# Patient Record
Sex: FEMALE | Race: WHITE | NOT HISPANIC OR LATINO | Employment: OTHER | URBAN - METROPOLITAN AREA
[De-identification: names, ages, dates, MRNs, and addresses within clinical notes are randomized per-mention and may not be internally consistent; named-entity substitution may affect disease eponyms.]

---

## 2017-09-22 ENCOUNTER — CONVERSION ENCOUNTER (OUTPATIENT)
Dept: GENERAL RADIOLOGY | Facility: HOSPITAL | Age: 67
End: 2017-09-22

## 2018-06-13 ENCOUNTER — OFFICE VISIT CONVERTED (OUTPATIENT)
Dept: GASTROENTEROLOGY | Facility: CLINIC | Age: 68
End: 2018-06-13
Attending: NURSE PRACTITIONER

## 2018-07-05 ENCOUNTER — CONVERSION ENCOUNTER (OUTPATIENT)
Dept: FAMILY MEDICINE CLINIC | Facility: CLINIC | Age: 68
End: 2018-07-05

## 2018-07-05 ENCOUNTER — OFFICE VISIT CONVERTED (OUTPATIENT)
Dept: FAMILY MEDICINE CLINIC | Facility: CLINIC | Age: 68
End: 2018-07-05
Attending: FAMILY MEDICINE

## 2018-09-18 ENCOUNTER — OFFICE VISIT CONVERTED (OUTPATIENT)
Dept: FAMILY MEDICINE CLINIC | Facility: CLINIC | Age: 68
End: 2018-09-18
Attending: FAMILY MEDICINE

## 2018-09-18 ENCOUNTER — CONVERSION ENCOUNTER (OUTPATIENT)
Dept: FAMILY MEDICINE CLINIC | Facility: CLINIC | Age: 68
End: 2018-09-18

## 2018-09-27 ENCOUNTER — OFFICE VISIT CONVERTED (OUTPATIENT)
Dept: GASTROENTEROLOGY | Facility: CLINIC | Age: 68
End: 2018-09-27
Attending: NURSE PRACTITIONER

## 2019-01-08 ENCOUNTER — HOSPITAL ENCOUNTER (OUTPATIENT)
Dept: GENERAL RADIOLOGY | Facility: HOSPITAL | Age: 69
Discharge: HOME | End: 2019-01-08
Attending: OBSTETRICS & GYNECOLOGY

## 2019-01-08 ENCOUNTER — OFFICE VISIT CONVERTED (OUTPATIENT)
Dept: FAMILY MEDICINE CLINIC | Facility: CLINIC | Age: 69
End: 2019-01-08
Attending: FAMILY MEDICINE

## 2019-01-08 ENCOUNTER — CONVERSION ENCOUNTER (OUTPATIENT)
Dept: FAMILY MEDICINE CLINIC | Facility: CLINIC | Age: 69
End: 2019-01-08

## 2019-02-07 ENCOUNTER — CONVERSION ENCOUNTER (OUTPATIENT)
Dept: FAMILY MEDICINE CLINIC | Facility: CLINIC | Age: 69
End: 2019-02-07

## 2019-02-07 ENCOUNTER — OFFICE VISIT CONVERTED (OUTPATIENT)
Dept: FAMILY MEDICINE CLINIC | Facility: CLINIC | Age: 69
End: 2019-02-07
Attending: FAMILY MEDICINE

## 2019-03-21 ENCOUNTER — OFFICE VISIT CONVERTED (OUTPATIENT)
Dept: FAMILY MEDICINE CLINIC | Facility: CLINIC | Age: 69
End: 2019-03-21
Attending: FAMILY MEDICINE

## 2019-03-21 ENCOUNTER — CONVERSION ENCOUNTER (OUTPATIENT)
Dept: FAMILY MEDICINE CLINIC | Facility: CLINIC | Age: 69
End: 2019-03-21

## 2019-03-27 ENCOUNTER — CONVERSION ENCOUNTER (OUTPATIENT)
Dept: GASTROENTEROLOGY | Facility: CLINIC | Age: 69
End: 2019-03-27

## 2019-03-27 ENCOUNTER — OFFICE VISIT CONVERTED (OUTPATIENT)
Dept: GASTROENTEROLOGY | Facility: CLINIC | Age: 69
End: 2019-03-27
Attending: NURSE PRACTITIONER

## 2019-06-24 ENCOUNTER — CONVERSION ENCOUNTER (OUTPATIENT)
Dept: FAMILY MEDICINE CLINIC | Facility: CLINIC | Age: 69
End: 2019-06-24

## 2019-06-24 ENCOUNTER — OFFICE VISIT CONVERTED (OUTPATIENT)
Dept: FAMILY MEDICINE CLINIC | Facility: CLINIC | Age: 69
End: 2019-06-24
Attending: FAMILY MEDICINE

## 2019-09-26 ENCOUNTER — HOSPITAL ENCOUNTER (OUTPATIENT)
Dept: FAMILY MEDICINE CLINIC | Facility: CLINIC | Age: 69
Discharge: HOME | End: 2019-09-26
Attending: FAMILY MEDICINE

## 2019-09-26 ENCOUNTER — CONVERSION ENCOUNTER (OUTPATIENT)
Dept: FAMILY MEDICINE CLINIC | Facility: CLINIC | Age: 69
End: 2019-09-26

## 2019-09-26 ENCOUNTER — OFFICE VISIT CONVERTED (OUTPATIENT)
Dept: FAMILY MEDICINE CLINIC | Facility: CLINIC | Age: 69
End: 2019-09-26
Attending: FAMILY MEDICINE

## 2019-09-26 LAB
ALBUMIN SERPL-MCNC: 4.6 G/DL (ref 3.5–5)
ALBUMIN/GLOB SERPL: 1.8 {RATIO} (ref 1.4–2.6)
ALP SERPL-CCNC: 65 U/L (ref 43–160)
ALT SERPL-CCNC: 24 U/L (ref 10–40)
ANION GAP SERPL CALC-SCNC: 17 MMOL/L (ref 8–19)
AST SERPL-CCNC: 23 U/L (ref 15–50)
BASOPHILS # BLD AUTO: 0.03 10*3/UL (ref 0–0.2)
BASOPHILS NFR BLD AUTO: 0.5 % (ref 0–3)
BILIRUB SERPL-MCNC: 0.28 MG/DL (ref 0.2–1.3)
BUN SERPL-MCNC: 13 MG/DL (ref 5–25)
BUN/CREAT SERPL: 17 {RATIO} (ref 6–20)
CALCIUM SERPL-MCNC: 10 MG/DL (ref 8.7–10.4)
CHLORIDE SERPL-SCNC: 102 MMOL/L (ref 99–111)
CHOLEST SERPL-MCNC: 157 MG/DL (ref 107–200)
CHOLEST/HDLC SERPL: 3.7 {RATIO} (ref 3–6)
CONV ABS IMM GRAN: 0.01 10*3/UL (ref 0–0.2)
CONV CO2: 26 MMOL/L (ref 22–32)
CONV IMMATURE GRAN: 0.2 % (ref 0–1.8)
CONV TOTAL PROTEIN: 7.1 G/DL (ref 6.3–8.2)
CREAT UR-MCNC: 0.76 MG/DL (ref 0.5–0.9)
DEPRECATED RDW RBC AUTO: 47.6 FL (ref 36.4–46.3)
EOSINOPHIL # BLD AUTO: 0.16 10*3/UL (ref 0–0.7)
EOSINOPHIL # BLD AUTO: 2.6 % (ref 0–7)
ERYTHROCYTE [DISTWIDTH] IN BLOOD BY AUTOMATED COUNT: 13.2 % (ref 11.7–14.4)
GFR SERPLBLD BASED ON 1.73 SQ M-ARVRAT: >60 ML/MIN/{1.73_M2}
GLOBULIN UR ELPH-MCNC: 2.5 G/DL (ref 2–3.5)
GLUCOSE SERPL-MCNC: 78 MG/DL (ref 65–99)
HCT VFR BLD AUTO: 39 % (ref 37–47)
HDLC SERPL-MCNC: 43 MG/DL (ref 40–60)
HGB BLD-MCNC: 12.5 G/DL (ref 12–16)
LDLC SERPL CALC-MCNC: 87 MG/DL (ref 70–100)
LYMPHOCYTES # BLD AUTO: 2.71 10*3/UL (ref 1–5)
LYMPHOCYTES NFR BLD AUTO: 43.6 % (ref 20–45)
MCH RBC QN AUTO: 31.3 PG (ref 27–31)
MCHC RBC AUTO-ENTMCNC: 32.1 G/DL (ref 33–37)
MCV RBC AUTO: 97.7 FL (ref 81–99)
MONOCYTES # BLD AUTO: 0.54 10*3/UL (ref 0.2–1.2)
MONOCYTES NFR BLD AUTO: 8.7 % (ref 3–10)
NEUTROPHILS # BLD AUTO: 2.76 10*3/UL (ref 2–8)
NEUTROPHILS NFR BLD AUTO: 44.4 % (ref 30–85)
NRBC CBCN: 0 % (ref 0–0.7)
OSMOLALITY SERPL CALC.SUM OF ELEC: 291 MOSM/KG (ref 273–304)
PLATELET # BLD AUTO: 253 10*3/UL (ref 130–400)
PMV BLD AUTO: 11.7 FL (ref 9.4–12.3)
POTASSIUM SERPL-SCNC: 4.3 MMOL/L (ref 3.5–5.3)
RBC # BLD AUTO: 3.99 10*6/UL (ref 4.2–5.4)
SODIUM SERPL-SCNC: 141 MMOL/L (ref 135–147)
TRIGL SERPL-MCNC: 136 MG/DL (ref 40–150)
TSH SERPL-ACNC: 0.22 M[IU]/L (ref 0.27–4.2)
VLDLC SERPL-MCNC: 27 MG/DL (ref 5–37)
WBC # BLD AUTO: 6.21 10*3/UL (ref 4.8–10.8)

## 2019-09-30 ENCOUNTER — OFFICE VISIT CONVERTED (OUTPATIENT)
Dept: GASTROENTEROLOGY | Facility: CLINIC | Age: 69
End: 2019-09-30
Attending: NURSE PRACTITIONER

## 2019-10-18 ENCOUNTER — HOSPITAL ENCOUNTER (OUTPATIENT)
Dept: GASTROENTEROLOGY | Facility: HOSPITAL | Age: 69
Discharge: HOME | End: 2019-10-18
Attending: NURSE PRACTITIONER

## 2019-11-11 ENCOUNTER — HOSPITAL ENCOUNTER (OUTPATIENT)
Dept: GENERAL RADIOLOGY | Facility: HOSPITAL | Age: 69
Discharge: HOME | End: 2019-11-11
Attending: NURSE PRACTITIONER

## 2019-12-04 ENCOUNTER — HOSPITAL ENCOUNTER (OUTPATIENT)
Dept: FAMILY MEDICINE CLINIC | Facility: CLINIC | Age: 69
Discharge: HOME | End: 2019-12-04
Attending: FAMILY MEDICINE

## 2019-12-04 LAB — TSH SERPL-ACNC: 0.83 M[IU]/L (ref 0.27–4.2)

## 2020-01-21 ENCOUNTER — HOSPITAL ENCOUNTER (OUTPATIENT)
Dept: GENERAL RADIOLOGY | Facility: HOSPITAL | Age: 70
Discharge: HOME | End: 2020-01-21
Attending: OBSTETRICS & GYNECOLOGY

## 2020-03-10 ENCOUNTER — OFFICE VISIT CONVERTED (OUTPATIENT)
Dept: FAMILY MEDICINE CLINIC | Facility: CLINIC | Age: 70
End: 2020-03-10
Attending: FAMILY MEDICINE

## 2020-03-10 ENCOUNTER — CONVERSION ENCOUNTER (OUTPATIENT)
Dept: FAMILY MEDICINE CLINIC | Facility: CLINIC | Age: 70
End: 2020-03-10

## 2020-06-29 ENCOUNTER — CONVERSION ENCOUNTER (OUTPATIENT)
Dept: OTHER | Facility: HOSPITAL | Age: 70
End: 2020-06-29

## 2020-06-29 ENCOUNTER — OFFICE VISIT CONVERTED (OUTPATIENT)
Dept: FAMILY MEDICINE CLINIC | Facility: CLINIC | Age: 70
End: 2020-06-29
Attending: FAMILY MEDICINE

## 2020-07-09 ENCOUNTER — CONVERSION ENCOUNTER (OUTPATIENT)
Dept: FAMILY MEDICINE CLINIC | Facility: CLINIC | Age: 70
End: 2020-07-09

## 2020-07-09 ENCOUNTER — HOSPITAL ENCOUNTER (OUTPATIENT)
Dept: FAMILY MEDICINE CLINIC | Facility: CLINIC | Age: 70
Discharge: HOME | End: 2020-07-09
Attending: FAMILY MEDICINE

## 2020-07-09 ENCOUNTER — OFFICE VISIT CONVERTED (OUTPATIENT)
Dept: FAMILY MEDICINE CLINIC | Facility: CLINIC | Age: 70
End: 2020-07-09
Attending: FAMILY MEDICINE

## 2020-07-11 LAB — BACTERIA SPEC AEROBE CULT: NORMAL

## 2020-08-25 ENCOUNTER — OFFICE VISIT CONVERTED (OUTPATIENT)
Dept: FAMILY MEDICINE CLINIC | Facility: CLINIC | Age: 70
End: 2020-08-25
Attending: FAMILY MEDICINE

## 2020-08-25 ENCOUNTER — CONVERSION ENCOUNTER (OUTPATIENT)
Dept: FAMILY MEDICINE CLINIC | Facility: CLINIC | Age: 70
End: 2020-08-25

## 2020-08-26 ENCOUNTER — OFFICE VISIT CONVERTED (OUTPATIENT)
Dept: GASTROENTEROLOGY | Facility: CLINIC | Age: 70
End: 2020-08-26
Attending: NURSE PRACTITIONER

## 2020-10-20 ENCOUNTER — CONVERSION ENCOUNTER (OUTPATIENT)
Dept: FAMILY MEDICINE CLINIC | Facility: CLINIC | Age: 70
End: 2020-10-20

## 2020-10-20 ENCOUNTER — OFFICE VISIT CONVERTED (OUTPATIENT)
Dept: FAMILY MEDICINE CLINIC | Facility: CLINIC | Age: 70
End: 2020-10-20
Attending: FAMILY MEDICINE

## 2021-02-22 ENCOUNTER — HOSPITAL ENCOUNTER (OUTPATIENT)
Dept: GENERAL RADIOLOGY | Facility: HOSPITAL | Age: 71
Discharge: HOME | End: 2021-02-22
Attending: OBSTETRICS & GYNECOLOGY

## 2021-03-29 ENCOUNTER — CONVERSION ENCOUNTER (OUTPATIENT)
Dept: FAMILY MEDICINE CLINIC | Facility: CLINIC | Age: 71
End: 2021-03-29

## 2021-03-29 ENCOUNTER — HOSPITAL ENCOUNTER (OUTPATIENT)
Dept: FAMILY MEDICINE CLINIC | Facility: CLINIC | Age: 71
Discharge: HOME | End: 2021-03-29
Attending: FAMILY MEDICINE

## 2021-03-29 ENCOUNTER — OFFICE VISIT CONVERTED (OUTPATIENT)
Dept: FAMILY MEDICINE CLINIC | Facility: CLINIC | Age: 71
End: 2021-03-29
Attending: FAMILY MEDICINE

## 2021-03-29 LAB
ALBUMIN SERPL-MCNC: 4.7 G/DL (ref 3.5–5)
ALBUMIN/GLOB SERPL: 1.6 {RATIO} (ref 1.4–2.6)
ALP SERPL-CCNC: 71 U/L (ref 43–160)
ALT SERPL-CCNC: 19 U/L (ref 10–40)
AMPHET UR QL CFM: NEGATIVE
ANION GAP SERPL CALC-SCNC: 18 MMOL/L (ref 8–19)
AST SERPL-CCNC: 18 U/L (ref 15–50)
BARBITURATES UR QL: NEGATIVE
BASOPHILS # BLD AUTO: 0.04 10*3/UL (ref 0–0.2)
BASOPHILS NFR BLD AUTO: 0.6 % (ref 0–3)
BENZODIAZ UR QL SCN: NEGATIVE
BILIRUB SERPL-MCNC: 0.4 MG/DL (ref 0.2–1.3)
BUN SERPL-MCNC: 10 MG/DL (ref 5–25)
BUN/CREAT SERPL: 13 {RATIO} (ref 6–20)
CALCIUM SERPL-MCNC: 10.1 MG/DL (ref 8.7–10.4)
CHLORIDE SERPL-SCNC: 99 MMOL/L (ref 99–111)
CHOLEST SERPL-MCNC: 196 MG/DL (ref 107–200)
CHOLEST/HDLC SERPL: 3.7 {RATIO} (ref 3–6)
CONV ABS IMM GRAN: 0.02 10*3/UL (ref 0–0.2)
CONV AMP/METHAMP UR: NEGATIVE
CONV CO2: 25 MMOL/L (ref 22–32)
CONV COCAINE, UR: NEGATIVE
CONV IMMATURE GRAN: 0.3 % (ref 0–1.8)
CONV TOTAL PROTEIN: 7.6 G/DL (ref 6.3–8.2)
CREAT UR-MCNC: 0.78 MG/DL (ref 0.5–0.9)
DEPRECATED RDW RBC AUTO: 46.4 FL (ref 36.4–46.3)
EOSINOPHIL # BLD AUTO: 0.11 10*3/UL (ref 0–0.7)
EOSINOPHIL # BLD AUTO: 1.5 % (ref 0–7)
ERYTHROCYTE [DISTWIDTH] IN BLOOD BY AUTOMATED COUNT: 13.1 % (ref 11.7–14.4)
GFR SERPLBLD BASED ON 1.73 SQ M-ARVRAT: >60 ML/MIN/{1.73_M2}
GLOBULIN UR ELPH-MCNC: 2.9 G/DL (ref 2–3.5)
GLUCOSE SERPL-MCNC: 80 MG/DL (ref 65–99)
HCT VFR BLD AUTO: 40.4 % (ref 37–47)
HDLC SERPL-MCNC: 53 MG/DL (ref 40–60)
HGB BLD-MCNC: 13.1 G/DL (ref 12–16)
LDLC SERPL CALC-MCNC: 116 MG/DL (ref 70–100)
LYMPHOCYTES # BLD AUTO: 2.5 10*3/UL (ref 1–5)
LYMPHOCYTES NFR BLD AUTO: 34.4 % (ref 20–45)
MCH RBC QN AUTO: 31.4 PG (ref 27–31)
MCHC RBC AUTO-ENTMCNC: 32.4 G/DL (ref 33–37)
MCV RBC AUTO: 96.9 FL (ref 81–99)
MDMA UR QL SCN: NEGATIVE
METHADONE UR QL SCN: NEGATIVE
MONOCYTES # BLD AUTO: 0.49 10*3/UL (ref 0.2–1.2)
MONOCYTES NFR BLD AUTO: 6.7 % (ref 3–10)
NEUTROPHILS # BLD AUTO: 4.11 10*3/UL (ref 2–8)
NEUTROPHILS NFR BLD AUTO: 56.5 % (ref 30–85)
NRBC CBCN: 0 % (ref 0–0.7)
OPIATES UR QL SCN: NEGATIVE
OSMOLALITY SERPL CALC.SUM OF ELEC: 284 MOSM/KG (ref 273–304)
OXYCODONE UR QL SCN: NEGATIVE
PCP UR QL: NEGATIVE
PLATELET # BLD AUTO: 255 10*3/UL (ref 130–400)
PMV BLD AUTO: 11.4 FL (ref 9.4–12.3)
POTASSIUM SERPL-SCNC: 4.1 MMOL/L (ref 3.5–5.3)
RBC # BLD AUTO: 4.17 10*6/UL (ref 4.2–5.4)
SODIUM SERPL-SCNC: 138 MMOL/L (ref 135–147)
THC SERPLBLD CFM-MCNC: NEGATIVE NG/ML
TRIGL SERPL-MCNC: 136 MG/DL (ref 40–150)
TSH SERPL-ACNC: 0.82 M[IU]/L (ref 0.27–4.2)
VLDLC SERPL-MCNC: 27 MG/DL (ref 5–37)
WBC # BLD AUTO: 7.27 10*3/UL (ref 4.8–10.8)

## 2021-05-13 NOTE — PROGRESS NOTES
Progress Note      Patient Name: Shalonda Hart   Patient ID: [id number]   Sex: Female   YOB: 1950    Primary Care Provider: Alo Viramontes III MD   Referring Provider: Alo Viramontes III MD    Visit Date: June 29, 2020    Provider: Alo Viramontse III MD   Location: [facility]   Location Address: [address] [place] [address]  [id number]   Location Phone: [phone]          Chief Complaint  · clearance for surgery 7/15/2020, cystocele, rectocele, bladder prolapse, vaginal vaults prolapse repair.       History Of Present Illness  Shalonda Hart is a 69 year old /White female who presents for evaluation and treatment of:      HPI     Patient is a 69-year-old with a cystocele and rectocele, she is scheduled for surgery 7/15/2020.  Patient is here for clearance.     History of hypertension which is well controlled.  History of Hypothyroidism well controlled.    History of DVT after meniscus surgery    Review of systems     cardiovascular no chest pain no palpitations.  Respiratory no shortness of breath no dyspnea on exertion.   Neuro no sided weakness, no history of focal losses, no problems with speech, no history of TIAs.  Musculoskeletal no calf pain,  patient is exercising daily.    Physical exam     pulse ox is 97% on room air, heart rate 66, temperature 98, blood pressure 130/70, weight is 196 this is a 3 pound weight gain.  General no distress patient appears healthy.  Cardiovascular regular rhythm no murmur  Respiratory no increased work of breathing, lungs clear and equal bilaterally, no wheezes no rales no rhonchi  Neurologic mentation is normal, speech is normal, gait is normal, finger to finger touching is normal, absolutely normal neurologic  Abdomen soft non-tender, no liver enlargement, no spleen enlargement    Assessment     Patient is cleared for her surgery she is at low risk.  patient looks good, She is ready for surgery.  She will use a baby  aspirin afterwards,  she did have a DVT after a meniscus surgery in the past. With good activity and her aspirin should she should be fine.        Plan     Proceed with surgery.  continue exercising until the day of surgery.    take the aspirin after the surgery.       Past Medical History  Disease Name Date Onset Notes   Anxiety --  --    Arthritis --  --    Depression 09/03/2015 --    Diverticulitis --  --    Esophageal dysphagia --  --    Essential hypertension 09/03/2015 --    Fatty liver --  --    Gastroesophageal Reflux Disorder --  --    History of DVT (deep vein thrombosis) 01/08/2019 --    Hypertension --  --    Hypothyroid 07/05/2018 --    Irritable bowel syndrome (IBS) --  --    Low back pain 06/02/2014 --    Medication management 07/05/2018 --    Monitoring long-term use medication 09/03/2015 --    Morbid obesity due to excess calories --  --    Sinus congestion/allergies --  --    Skin lesion 09/03/2015 --    Status post gastric bypass for obesity 03/21/2019 --          Past Surgical History  Procedure Name Date Notes   Appendectomy 1969 --    Cesarian Section 1980, 1982 --    Cholecystectomy 1989 --    Colonoscopy 2017 --    Gastric Sleeve 2019 --    Hysterectomy 1984 --    Shoulder surgery 2001 left shoulder   Tonsillectomy 1968 --          Medication List  Name Date Started Instructions   Calcium 600 600 mg calcium (1,500 mg) oral tablet  --    citalopram 20 mg oral tablet 09/26/2019 TAKE ONE TABLET BY MOUTH DAILY   diltiazem HCl 240 mg oral capsule,extended release 24 hr 09/26/2019 TAKE ONE CAPSULE BY MOUTH DAILY   indapamide 2.5 mg oral tablet 09/26/2019 TAKE ONE TABLET BY MOUTH EVERY MORNING   irbesartan 300 mg oral tablet 09/26/2019 TAKE ONE TABLET BY MOUTH DAILY   levothyroxine 112 mcg oral tablet 03/10/2020 take 1 tablet (112 mcg) by oral route once daily for 90 days   lorazepam 1 mg oral tablet 03/10/2020 take 1 to 2 tablet by oral route BID PRN for 90 days   multivitamin oral capsule  --   "  Protonix 20 mg oral tablet,delayed release (/EC) 09/30/2019 take 1 tablet (20 mg) by oral route once daily for 90 days   Vitamin B-12 sublingual  --    Vitamin D3 2,000 unit oral capsule  take 1 capsule by oral route daily         Allergy List  Allergen Name Date Reaction Notes   hydrochlorothiazide --  --  --    Lidocaine --  --  --    Marcaine --  --  --    SULFA (SULFONAMIDES) --  --  --        Allergies Reconciled  Family Medical History  Disease Name Relative/Age Notes   Heart Disease Mother/   --    Diabetes, unspecified type Mother/  Sister/   --    Depression Mother/  Sister/   --    Hypertension Brother/  Father/  Mother/  Sister/   --          Social History  Finding Status Start/Stop Quantity Notes   Active but no formal exercise --  --/-- --  --    Advance directive declined by patient --  --/-- --  --    Alcohol Former --/-- --  06/07/2017 - denies alcohol use    Tobacco Never --/-- --  --          Immunizations  NameDate Admin Mfg Trade Name Lot Number Route Inj VIS Given VIS Publication   Hepatitis A02/18/2019 NE Not Entered  IM NE 02/21/2019    Comments: given at [facility]   Hepatitis A05/12/2018 NE HAVRIX-ADULT  NE NE 05/16/2018    Comments: given at [facility]   Qlvyvrdzt39/14/2019 NE Fluad  NE NE 10/15/2019    Comments: given at pharmacy   Xfvppkcdf48/15/2018 [facility] Fluzone Quadrivalent [id number] IM RD 10/15/2018 08/07/2015   Comments: ndc [id number]   Xnwfbeplv61/06/2017 [facility] Fluzone Quadrivalent [id number] IM LD 12/06/2017 08/07/2015   Comments: pt tolerated shot well         Vitals  Date Time BP Position Site L\R Cuff Size HR RR TEMP (F) WT  HT  BMI kg/m2 BSA m2 O2 Sat        06/29/2020 10:16 /70 Sitting    66 - R  98 195lbs 16oz 5'  7.5\" 30.24 2.06 97 %              Assessment  · History of DVT (deep vein thrombosis)     V12.51/Z86.718  · Medication management     V58.69/Z79.899  · Status post gastric bypass for " obesity     V45.86/Z98.84  · Hypertension     401.9/I10  · Hypothyroid     244.9/E03.9  · Preoperative clearance     V72.84/Z01.818  ok to proceed with surgery.  · Cystocele with rectocele       Cystocele, unspecified     618.01/N81.10  Rectocele     618.01/N81.6  · Vaginal prolapse     618.00/N81.10  · Bladder prolapse, female, acquired     618.01/N81.10    Problems Reconciled  Plan  · Orders  o ACO-39: Current medications updated and reviewed () - - 06/29/2020  · Medications  o Zantac 150 mg oral tablet   SIG: take 1 tablet by oral route 2 times a day as needed for 90 days   DISP: (180) tablets with 2 refills  Discontinued on 06/29/2020     o Medications have been Reconciled  o Transition of Care or Provider Policy  · Instructions  o Patient is taking medications as prescribed and doing well.   o Take all medications as prescribed/directed.  o Patient instructed/educated on their diet and exercise program.  o Patient was educated/instructed on their diagnosis, treatment and medications prior to discharge from the clinic today.  o Bring all medicines with their bottles to each office visit.  o Time spent with the patient was 25 minutes, more than 50% face to face.  o Chronic conditions reviewed and taken into consideration for today's treatment plan.  o Discussed Covid-19 precautions including, but not limited to, social distancing, avoid touching your face, and hand washing.             Electronically Signed by: Gabrielle Lancaster, -Author on June 29, 2020 11:29:01 AM  Electronically Co-signed by: Alo Viramontes III MD -Reviewer on June 29, 2020 05:13:46 PM

## 2021-05-13 NOTE — PROGRESS NOTES
Progress Note      Patient Name: Shalonda Hart   Patient ID: [id number]   Sex: Female   YOB: 1950    Primary Care Provider: Alo Viramontes III MD   Referring Provider: Alo Viramontes III MD    Visit Date: October 20, 2020    Provider: Alo Viramontes III MD   Location: [facility]   Location Address: [address] [place] [address]  [id number]   Location Phone: [phone]          Chief Complaint  · right hip pain, grabbing pain since September, difficult to walk and muscles tight      History Of Present Illness  Shalonda Hart is a 70 year old /White female who presents for evaluation and treatment of: right hip pain since September, the pain in a grabbing pain, the pain does not radiate down leg. Pt states she is having difficulty walking and muscles feels tight.      HPI     patient is a 70-year-old she has hurt her back.  The pain is in her right buttock, Grabs her.  When she moves it hurts more.  No pain down the leg.  No weakness in the leg.      Review of systems     neurologic no leg weakness.  No burning pain.  Musculoskeletal no particular pain in the groin or the hips of the knee.  Cardiovascular chest pain no palpitations  Respiratory no shortness of breath dyspnea on exertion    Physical exam     weights 196 this is a 1 pound weight gain blood pressure 138/70 temperature is heart rate 75  General no distress  Cardiovascular regular rhythm no murmur  Respiratory no wheezes  Musculoskeletal good internal and external rotation  Back negative, straight leg raise tender to percussion especially right lower back.  When bends and flexes to the side and forward more pain.    Assessment     musculoskeletal back pain.  Will give Flexeril gave her a back sheet    Plan     recheck as needed   Flexeril 1/2-1 tab 3 times daily as well as Tylenol arthritis he may use NSAIDs intermittently.  Should get better over a few weeks.       Past Medical  History  Disease Name Date Onset Notes   Anxiety --  --    Arthritis --  --    Depression 09/03/2015 --    Diverticulitis --  --    Esophageal dysphagia --  --    Essential hypertension 09/03/2015 --    Fatty liver --  --    Gastroesophageal Reflux Disorder --  --    History of cystocele 08/25/2020 repaired 7/2020   History of DVT (deep vein thrombosis) 01/08/2019 --    History of repair of rectocele 08/25/2020 repaired 7/2020   Hypertension --  --    Hypothyroid 07/05/2018 --    Irritable bowel syndrome (IBS) --  --    Low back pain 06/02/2014 --    Medication management 07/05/2018 --    Monitoring long-term use medication 09/03/2015 --    Morbid obesity due to excess calories --  --    Sinus congestion/allergies --  --    Skin lesion 09/03/2015 --    Status post gastric bypass for obesity 03/21/2019 --          Past Surgical History  Procedure Name Date Notes   Appendectomy 1969 --    Cesarian Section 1980, 1982 --    Cholecystectomy 1989 --    Colonoscopy 2017 --    Cystocele 07/15/2020 --    Gastric Sleeve 2019 --    Hysterectomy 1984 --    Rectocele 07/15/2020 --    Shoulder surgery 2001 left shoulder   Tonsillectomy 1968 --    Vaginal Wall Repair 07/15/2020 vaginal vault lift         Medication List  Name Date Started Instructions   Calcium 600 600 mg calcium (1,500 mg) oral tablet  --    citalopram 20 mg oral tablet 08/25/2020 TAKE ONE TABLET BY MOUTH DAILY   diltiazem HCl 240 mg oral capsule,extended release 24 hr 08/25/2020 TAKE ONE CAPSULE BY MOUTH DAILY   indapamide 2.5 mg oral tablet 08/25/2020 TAKE ONE TABLET BY MOUTH EVERY MORNING   irbesartan 300 mg oral tablet 08/25/2020 TAKE ONE TABLET BY MOUTH DAILY   levothyroxine 112 mcg oral tablet 08/25/2020 take 1 tablet (112 mcg) by oral route once daily for 90 days   lorazepam 1 mg oral tablet 08/25/2020 take 1 to 2 tablet by oral route BID PRN for 90 days   multivitamin oral capsule  --    PreserVision AREDS oral  --    Protonix 20 mg oral tablet,delayed  release (/EC) 08/26/2020 take 1 tablet (20 mg) by oral route once daily for 90 days   Vitamin B-12 sublingual  --    Vitamin D3 2,000 unit oral capsule  take 1 capsule by oral route daily         Allergy List  Allergen Name Date Reaction Notes   hydrochlorothiazide --  --  --    Lidocaine --  --  --    Marcaine --  --  --    SULFA (SULFONAMIDES) --  --  --        Allergies Reconciled  Family Medical History  Disease Name Relative/Age Notes   Heart Disease Mother/   --    Diabetes, unspecified type Mother/  Sister/   --    Depression Mother/  Sister/   --    Hypertension Brother/  Father/  Mother/  Sister/   --          Social History  Finding Status Start/Stop Quantity Notes   Active but no formal exercise --  --/-- --  --    Advance Care Plan/Surrogate Decision Maker scanned into EMR --  --/-- --  --    Advance directive declined by patient --  --/-- --  --    Alcohol Former --/-- --  06/07/2017 - denies alcohol use    Tobacco Never --/-- --  --          Immunizations  NameDate Admin Mfg Trade Name Lot Number Route Inj VIS Given VIS Publication   Hepatitis A02/18/2019 NE Not Entered  IM NE 02/21/2019    Comments: given at [facility]   Hepatitis A05/12/2018 NE HAVRIX-ADULT  NE NE 05/16/2018    Comments: given at [facility]   Nksvuoiml34/20/2020 [facility] Fluzone Quadrivalent [id number] IM RD 10/20/2020 08/15/2019   Comments: NDC [id number]         Review of Systems  · Constitutional  o * See HPI  · Eyes  o * See HPI  · HENT  o * See HPI  · Breasts  o * See HPI  · Cardiovascular  o * See HPI  · Respiratory  o * See HPI  · Gastrointestinal  o * See HPI  · Genitourinary  o * See HPI  · Integument  o * See HPI  · Neurologic  o * See HPI  · Musculoskeletal  o * See HPI  · Endocrine  o * See HPI  · Psychiatric  o * See HPI  · Heme-Lymph  o * See HPI  · Allergic-Immunologic  o * See HPI      Vitals  Date Time BP Position Site L\R Cuff Size HR RR TEMP (F) WT  HT  BMI kg/m2 BSA m2 O2 Sat FR L/min FiO2 HC      "  10/20/2020 11:17 /70 Sitting    75 - R  97 195lbs 16oz 5'  7.5\" 30.24 2.06 98 %  21%              Results  · In-Office Procedures  o Lab procedure  § IOP - Urine Drug Screen In-House [facility] (02259)   § Amphetamines Ur Ql: Negative   § Barbiturates Ur Ql: Negative   § Buprenorphine+Nor Ur Ql Scn: Negative   § Benzodiaz Ur Ql: Positive   § Cocaine Ur Ql: Negative   § Methadone Ur Ql: Negative   § Methamphet Ur Ql: Negative   § MDMA Ur Ql Scn: Negative   § Opiates Ur Ql: Negative   § Oxycodone Ur Ql: Negative   § PCP Ur Ql: Negative   § THC Ur Ql: Negative   § Temp in Range?: Within/Acceptable   § Control Seen?: Yes       Assessment  · Need for influenza vaccination     V04.81/Z23  · Medication management     V58.69/Z79.899  · Hypertension     401.9/I10  · Low back pain     724.2/M54.5  · Hip pain, acute, right     719.45/M25.551  · Musculoskeletal back pain     724.5/M54.9  · Muscle tightness     728.9/M62.89    Problems Reconciled  Plan  · Orders  o Fluzone Quadrivalent Vaccine, age 6 months + (38373) - V04.81/Z23 - 10/20/2020   Vaccine - Influenza; Dose: 0.5; Site: Right Deltoid; Route: Intramuscular; Date: 10/20/2020 11:47:00; Exp: 06/30/2021; Lot: [id number]; Mfg: [employer] pasteur; TradeName: Fluzone Quadrivalent; Administered By: Gabrielle Lancaster; Comment: NDC [id number]  o Administration of Influenza Vaccine - Medicare () - V04.81/Z23 - 10/20/2020  o ACO-39: Current medications updated and reviewed (, 1159F) - - 10/20/2020  · Medications  o cyclobenzaprine 10 mg oral tablet   SIG: take 1 tablet (10 mg) by oral route 3 times per day   DISP: (30) Tablet with 1 refills  Prescribed on 10/20/2020     o Medications have been Reconciled  o Transition of Care or Provider Policy  · Instructions  o Patient is taking medications as prescribed and doing well.   o Take all medications as prescribed/directed.  o Patient instructed/educated on their diet and exercise program.  o Patient was educated/instructed on " their diagnosis, treatment and medications prior to discharge from the clinic today.  o Bring all medicines with their bottles to each office visit.  o Time spent with the patient was 20 minutes, more than 50% face to face.  o Chronic conditions reviewed and taken into consideration for today's treatment plan.  o Discussed Covid-19 precautions including, but not limited to, social distancing, avoid touching your face, and hand washing.             Electronically Signed by: Gabrielle Lancaster, -Author on October 20, 2020 05:22:16 PM  Electronically Co-signed by: Alo Viramontes III MD -Reviewer on October 21, 2020 02:08:49 PM

## 2021-05-13 NOTE — PROGRESS NOTES
Progress Note      Patient Name: Shalonda Hart   Patient ID: [id number]   Sex: Female   YOB: 1950    Primary Care Provider: Alo Viramontes III MD   Referring Provider: Alo Viramontes III MD    Visit Date: July 9, 2020    Provider: Alo Viramontes III MD   Location: [facility]   Location Address: [address] [place] [address]  [id number]   Location Phone: [phone]          Chief Complaint  · area of redness in the pubic area      History Of Present Illness  Shalonda Hart is a 69 year old /White female who presents for evaluation and treatment of:      HPI     patient is a 69-year scheduled to have surgery for vaginal and bladder prolapse next week.  She noticed a nodule in her pubic area right side,  some redness and some tenderness.  Pt states it is getting slightly larger.  No past history of methicillin-resistant or staph.  She has hypertension, hypothyroidism, class III obesity status post bariatric surgery.    Review of systems     no other skin lesions.     history due on 7/15/2020 surgery for vaginal prolapse of bladder prolapse, needs to tell surgery if this is a staph, await culture.      Physical exam     blood pressures 128/70 weight 196 no weight gain or loss temperature 97.4  General in no distress   abdomen right pubic nodule 2 cm x 2cm with a small pustule.  No no significant cellulitis around.    In office procedure     after careful chlorhexidine prep 1% Xylocaine anesthesia patient had an I&D and packing of the 2 cm x 2 cm abscess of pus did come out.  Patient is to soak in twice a day.  Culture was done from inside the abscess.      Assessment     I&D 2 x 2 centimeter right pubic abscess,  no antibiotics will be given, watch for cellulitis and await culture results    Plan     call Monday for the culture results, she will need to notify surgeon if it is staph.  Call if cellulitis increases.  Remove packing half-inch today.       Past  Medical History  Disease Name Date Onset Notes   Anxiety --  --    Arthritis --  --    Depression 09/03/2015 --    Diverticulitis --  --    Esophageal dysphagia --  --    Essential hypertension 09/03/2015 --    Fatty liver --  --    Gastroesophageal Reflux Disorder --  --    History of DVT (deep vein thrombosis) 01/08/2019 --    Hypertension --  --    Hypothyroid 07/05/2018 --    Irritable bowel syndrome (IBS) --  --    Low back pain 06/02/2014 --    Medication management 07/05/2018 --    Monitoring long-term use medication 09/03/2015 --    Morbid obesity due to excess calories --  --    Sinus congestion/allergies --  --    Skin lesion 09/03/2015 --    Status post gastric bypass for obesity 03/21/2019 --          Past Surgical History  Procedure Name Date Notes   Appendectomy 1969 --    Cesarian Section 1980, 1982 --    Cholecystectomy 1989 --    Colonoscopy 2017 --    Gastric Sleeve 2019 --    Hysterectomy 1984 --    Shoulder surgery 2001 left shoulder   Tonsillectomy 1968 --          Medication List  Name Date Started Instructions   Calcium 600 600 mg calcium (1,500 mg) oral tablet  --    citalopram 20 mg oral tablet 09/26/2019 TAKE ONE TABLET BY MOUTH DAILY   diltiazem HCl 240 mg oral capsule,extended release 24 hr 09/26/2019 TAKE ONE CAPSULE BY MOUTH DAILY   indapamide 2.5 mg oral tablet 09/26/2019 TAKE ONE TABLET BY MOUTH EVERY MORNING   irbesartan 300 mg oral tablet 09/26/2019 TAKE ONE TABLET BY MOUTH DAILY   levothyroxine 112 mcg oral tablet 03/10/2020 take 1 tablet (112 mcg) by oral route once daily for 90 days   lorazepam 1 mg oral tablet 03/10/2020 take 1 to 2 tablet by oral route BID PRN for 90 days   multivitamin oral capsule  --    Protonix 20 mg oral tablet,delayed release (DR/EC) 09/30/2019 take 1 tablet (20 mg) by oral route once daily for 90 days   Vitamin B-12 sublingual  --    Vitamin D3 2,000 unit oral capsule  take 1 capsule by oral route daily         Allergy List  Allergen Name Date Reaction  "Notes   hydrochlorothiazide --  --  --    Lidocaine --  --  --    Marcaine --  --  --    SULFA (SULFONAMIDES) --  --  --          Family Medical History  Disease Name Relative/Age Notes   Heart Disease Mother/   --    Diabetes, unspecified type Mother/  Sister/   --    Depression Mother/  Sister/   --    Hypertension Brother/  Father/  Mother/  Sister/   --          Social History  Finding Status Start/Stop Quantity Notes   Active but no formal exercise --  --/-- --  --    Advance Care Plan/Surrogate Decision Maker scanned into EMR --  --/-- --  --    Advance directive declined by patient --  --/-- --  --    Alcohol Former --/-- --  06/07/2017 - denies alcohol use    Tobacco Never --/-- --  --          Immunizations  NameDate Admin Mfg Trade Name Lot Number Route Inj VIS Given VIS Publication   Hepatitis A02/18/2019 NE Not Entered  IM NE 02/21/2019    Comments: given at [facility]   Hepatitis A05/12/2018 NE HAVRIX-ADULT  NE NE 05/16/2018    Comments: given at [facility]   Jqvpxcxaz29/14/2019 NE Fluad  NE NE 10/15/2019    Comments: given at pharmacy   Nyustgcru36/15/2018 [facility] Fluzone Quadrivalent [id number] IM RD 10/15/2018 08/07/2015   Comments: ndc [id number]   Atcfjcgcn15/06/2017 [facility] Fluzone Quadrivalent [id number] IM LD 12/06/2017 08/07/2015   Comments: pt tolerated shot well         Review of Systems  · Constitutional  o * See HPI  · Eyes  o * See HPI  · HENT  o * See HPI  · Breasts  o * See HPI  · Cardiovascular  o * See HPI  · Respiratory  o * See HPI  · Gastrointestinal  o * See HPI  · Genitourinary  o * See HPI  · Integument  o * See HPI  · Neurologic  o * See HPI  · Musculoskeletal  o * See HPI  · Endocrine  o * See HPI  · Psychiatric  o * See HPI  · Heme-Lymph  o * See HPI  · Allergic-Immunologic  o * See HPI      Vitals  Date Time BP Position Site L\R Cuff Size HR RR TEMP (F) WT  HT  BMI kg/m2 BSA m2 O2 Sat        07/09/2020 01:18 /70 Sitting      97.4 195lbs 16oz 5'  7.5\" 30.24 2.06   "             Assessment  · Screening for depression     V79.0/Z13.89  · Abscess     682.9/L02.91  · Folliculitis     704.8/L73.9    Problems Reconciled  Plan  · Orders  o ACO-39: Current medications updated and reviewed () - - 07/09/2020  o ACO-18: Negative screen for clinical depression using a standardized tool () - - 07/09/2020  o I & D abscess, complicated or [facility] (42093) - 682.9/L02.91, 704.8/L73.9 - 07/09/2020  o Abscess culture and sensitivity (85421) - 682.9/L02.91, 704.8/L73.9 - 07/09/2020   right pubic area  · Medications  o Medications have been Reconciled  o Transition of Care or Provider Policy  · Instructions  o Depression Screen completed and scanned into the EMR under the designated folder within the patient's documents.  o Today's PHQ-9 result is _1__  o The provider screening met the required time of 15 minutes.  o Patient is taking medications as prescribed and doing well.   o Take all medications as prescribed/directed.  o Patient instructed/educated on their diet and exercise program.  o Patient was educated/instructed on their diagnosis, treatment and medications prior to discharge from the clinic today.  o Bring all medicines with their bottles to each office visit.  o Time spent with the patient was 20 minutes, more than 50% face to face.  o Chronic conditions reviewed and taken into consideration for today's treatment plan.  o Discussed Covid-19 precautions including, but not limited to, social distancing, avoid touching your face, and hand washing.             Electronically Signed by: Gabrielle Lancaster, -Author on July 9, 2020 01:39:07 PM  Electronically Co-signed by: Alo Viramontes III MD -Reviewer on July 9, 2020 02:06:04 PM

## 2021-05-13 NOTE — PROGRESS NOTES
Progress Note      Patient Name: Shalonda Hart   Patient ID: [id number]   Sex: Female   YOB: 1950    Primary Care Provider: Alo Viramontes III MD   Referring Provider: Alo Viramontes III MD    Visit Date: August 25, 2020    Provider: Alo Viramontes III MD   Location: [facility]   Location Address: [address] [place] [address]  [id number]   Location Phone: [phone]          Chief Complaint  · 6 month follow up lorazepam for anxiety      History Of Present Illness  Shalonda Hart is a 69 year old /White female who presents for evaluation and treatment of: here for 6 month follow up anxiety medication, she is taking lorazepam and this continues to work well for them. Pt recently had a cystocele/rectocele and total vaginal vault lift on 7/15/2020      HPI     patient is a 69-year-old  She is here for her 6 month prescription on her Ativan, she has generalized anxiety history status post gastric sleeve.   history of hypertension  history of hypothyroidism. .    Review of systems     cardiovascular no chest pain no [id number]  Respiratory no shortness of breath no dyspnea exertion   pelvic prolapse surgery still needs a sling is is doing well with that though.  GI status post gastric sleeve has gone from 244 to 196, approximately 50 pound weight loss.  Patient had a colonoscopy in 2017 is due in 2022  Musculoskeletal normal bone mass.  Psych doing very well with her Ativan no particular problems with anxiety or depression.    Physical exam     weight is 196 there is no weight gain or loss, blood pressure is 124/72, temperature is 97.4  General no distress  Cardiovascular regular rhythm no murmur  respiratory no increased work of breathing lungs clear and equal bilaterally  Psych does not appear either anxious or depressed.    Assessment     #1 generalized anxiety well controlled   #2 hypertension controlled   #3 class III obesity, better after the gastric  sleeve about a 50 pound weight loss.    #4 status post pelvic prolapse surgery doing well    Plan    Ativan 1 mg give 180 pills and 1 refill   start exercising       Past Medical History  Disease Name Date Onset Notes   Anxiety --  --    Arthritis --  --    Depression 09/03/2015 --    Diverticulitis --  --    Esophageal dysphagia --  --    Essential hypertension 09/03/2015 --    Fatty liver --  --    Gastroesophageal Reflux Disorder --  --    History of cystocele 08/25/2020 repaired 7/2020   History of DVT (deep vein thrombosis) 01/08/2019 --    History of repair of rectocele 08/25/2020 repaired 7/2020   Hypertension --  --    Hypothyroid 07/05/2018 --    Irritable bowel syndrome (IBS) --  --    Low back pain 06/02/2014 --    Medication management 07/05/2018 --    Monitoring long-term use medication 09/03/2015 --    Morbid obesity due to excess calories --  --    Sinus congestion/allergies --  --    Skin lesion 09/03/2015 --    Status post gastric bypass for obesity 03/21/2019 --          Past Surgical History  Procedure Name Date Notes   Appendectomy 1969 --    Cesarian Section 1980, 1982 --    Cholecystectomy 1989 --    Colonoscopy 2017 --    Cystocele 07/15/2020 --    Gastric Sleeve 2019 --    Hysterectomy 1984 --    Rectocele 07/15/2020 --    Shoulder surgery 2001 left shoulder   Tonsillectomy 1968 --    Vaginal Wall Repair 07/15/2020 vaginal vault lift         Medication List  Name Date Started Instructions   Calcium 600 600 mg calcium (1,500 mg) oral tablet  --    citalopram 20 mg oral tablet 08/25/2020 TAKE ONE TABLET BY MOUTH DAILY   diltiazem HCl 240 mg oral capsule,extended release 24 hr 08/25/2020 TAKE ONE CAPSULE BY MOUTH DAILY   indapamide 2.5 mg oral tablet 08/25/2020 TAKE ONE TABLET BY MOUTH EVERY MORNING   irbesartan 300 mg oral tablet 08/25/2020 TAKE ONE TABLET BY MOUTH DAILY   levothyroxine 112 mcg oral tablet 08/25/2020 take 1 tablet (112 mcg) by oral route once daily for 90 days   lorazepam 1 mg  oral tablet 08/25/2020 take 1 to 2 tablet by oral route BID PRN for 90 days   multivitamin oral capsule  --    Protonix 20 mg oral tablet,delayed release (/EC) 09/30/2019 take 1 tablet (20 mg) by oral route once daily for 90 days   Vitamin B-12 sublingual  --    Vitamin D3 2,000 unit oral capsule  take 1 capsule by oral route daily         Allergy List  Allergen Name Date Reaction Notes   hydrochlorothiazide --  --  --    Lidocaine --  --  --    Marcaine --  --  --    SULFA (SULFONAMIDES) --  --  --        Allergies Reconciled  Family Medical History  Disease Name Relative/Age Notes   Heart Disease Mother/   --    Diabetes, unspecified type Mother/  Sister/   --    Depression Mother/  Sister/   --    Hypertension Brother/  Father/  Mother/  Sister/   --          Social History  Finding Status Start/Stop Quantity Notes   Active but no formal exercise --  --/-- --  --    Advance Care Plan/Surrogate Decision Maker scanned into EMR --  --/-- --  --    Advance directive declined by patient --  --/-- --  --    Alcohol Former --/-- --  06/07/2017 - denies alcohol use    Tobacco Never --/-- --  --          Immunizations  NameDate Admin Mfg Trade Name Lot Number Route Inj VIS Given VIS Publication   Hepatitis A02/18/2019 NE Not Entered  IM NE 02/21/2019    Comments: given at [facility]   Hepatitis A05/12/2018 NE HAVRIX-ADULT  NE NE 05/16/2018    Comments: given at [facility]   Fcwwetech54/14/2019 NE Fluad  NE NE 10/15/2019    Comments: given at pharmacy   Ulkbgeldl82/15/2018 [facility] Fluzone Quadrivalent [id number] IM RD 10/15/2018 08/07/2015   Comments: ndc [id number]   Wmcurxqhu69/06/2017 [facility] Fluzone Quadrivalent [id number] IM LD 12/06/2017 08/07/2015   Comments: pt tolerated shot well         Review of Systems  · Constitutional  o * See HPI  · Eyes  o * See HPI  · HENT  o * See HPI  · Breasts  o * See HPI  · Cardiovascular  o * See HPI  · Respiratory  o * See HPI  · Gastrointestinal  o * See HPI  · Genitourinary  o *  "See HPI  · Integument  o * See HPI  · Neurologic  o * See HPI  · Musculoskeletal  o * See HPI  · Endocrine  o * See HPI  · Psychiatric  o * See HPI  · Heme-Lymph  o * See HPI  · Allergic-Immunologic  o * See HPI      Vitals  Date Time BP Position Site L\R Cuff Size HR RR TEMP (F) WT  HT  BMI kg/m2 BSA m2 O2 Sat HC       08/25/2020 10:36 /72 Sitting      97.4 195lbs 16oz 5'  7.5\" 30.24 2.06               Results  · In-Office Procedures  o Lab procedure  § IOP - Urine Drug Screen In-House [facility] ([address])   § Amphetamines Ur Ql: Negative   § Barbiturates Ur Ql: Negative   § Buprenorphine+Nor Ur Ql Scn: Negative   § Benzodiaz Ur Ql: Positive   § Cocaine Ur Ql: Negative   § Methadone Ur Ql: Negative   § Methamphet Ur Ql: Negative   § MDMA Ur Ql Scn: Negative   § Opiates Ur Ql: Negative   § Oxycodone Ur Ql: Negative   § PCP Ur Ql: Negative   § THC Ur Ql: Negative   § Temp in Range?: Within/Acceptable   § Control Seen?: Yes       Assessment  · Medication management     V58.69/Z79.899  · Status post gastric bypass for obesity     V45.86/Z98.84  · Anxiety     300.02/F41.1  · Arthritis     716.90/M19.90  · Hypertension     401.9/I10  · Hypothyroid     244.9/E03.9  · History of cystocele     V13.09/Z87.448  repaired 7/2020 at [facility]  · History of repair of rectocele     V45.89/Z98.890  repaired 7/2020 [facility]     Problems Reconciled  Plan  · Orders  o ACO-39: Current medications updated and reviewed () - - 08/25/2020  o ACO-19: Colorectal cancer screening results documented and reviewed (3017F) - - 08/25/2020  · Medications  o lorazepam 1 mg oral tablet   SIG: take 1 to 2 tablet by oral route BID PRN for 90 days   DISP: (180) tablet with 1 refills  Adjusted on 08/25/2020     o citalopram 20 mg oral tablet   SIG: TAKE ONE TABLET BY MOUTH DAILY   DISP: (90) Tablet with 1 refills  Refilled on 08/25/2020     o diltiazem HCl 240 mg oral capsule,extended release 24 hr   SIG: TAKE ONE CAPSULE BY " MOUTH DAILY   DISP: (90) Unspecified with 1 refills  Refilled on 08/25/2020     o indapamide 2.5 mg oral tablet   SIG: TAKE ONE TABLET BY MOUTH EVERY MORNING   DISP: (90) Tablet with 1 refills  Refilled on 08/25/2020     o irbesartan 300 mg oral tablet   SIG: TAKE ONE TABLET BY MOUTH DAILY   DISP: (90) Tablet with 1 refills  Refilled on 08/25/2020     o levothyroxine 112 mcg oral tablet   SIG: take 1 tablet (112 mcg) by oral route once daily for 90 days   DISP: (90) Tablet with 1 refills  Refilled on 08/25/2020     o Medications have been Reconciled  o Transition of Care or Provider Policy  · Instructions  o Patient is taking medications as prescribed and doing well.   o Take all medications as prescribed/directed.  o Patient instructed/educated on their diet and exercise program.  o Patient was educated/instructed on their diagnosis, treatment and medications prior to discharge from the clinic today.  o Bring all medicines with their bottles to each office visit.  o Time spent with the patient was 30 minutes, more than 50% face to face.  o Chronic conditions reviewed and taken into consideration for today's treatment plan.  o Discussed Covid-19 precautions including, but not limited to, social distancing, avoid touching your face, and hand washing.             Electronically Signed by: Gabrielle Lancaster, -Author on August 25, 2020 06:24:50 PM  Electronically Co-signed by: Alo Viramontes III MD -Reviewer on August 26, 2020 12:06:51 PM

## 2021-05-13 NOTE — PROGRESS NOTES
Progress Note      Patient Name: Shalonda Hart   Patient ID: [id number]   Sex: Female   YOB: 1950    Primary Care Provider: Alo Viramontes III MD   Referring Provider: Alo Viramontes III MD    Visit Date: August 26, 2020    Provider: MARGARITA Rose   Location: [facility]   Location Address: [address] [place] [address], [address]  [place], [place]  [id number]   Location Phone: [phone]          Chief Complaint  · Follow Up Fatty Liver      History Of Present Illness     Ms. Hart presents for f/u of fatty liver and GERD.      She underwent repair of rectocele and cystocele 6 weeks ago in [place].  Reports that she's doing well.      She reports a regular bowel movement.      Only taking protonix PRN when eating spicy foods.      Previously underwent gastric sleeve and had a significant weight loss.  Repeat fibroscan indicates that fatty liver is resolved.       Past Medical History  Anxiety; Arthritis; Depression; Diverticulitis; Esophageal dysphagia; Essential hypertension; Fatty liver; Gastroesophageal Reflux Disorder; History of cystocele; History of DVT (deep vein thrombosis); History of repair of rectocele; Hypertension; Hypothyroid; Irritable bowel syndrome (IBS); Low back pain; Medication management; Monitoring long-term use medication; Morbid obesity due to excess calories; Sinus congestion/allergies; Skin lesion; Status post gastric bypass for obesity         Past Surgical History  Appendectomy; Cesarian Section; Cholecystectomy; Colonoscopy; Cystocele; Gastric Sleeve; Hysterectomy; Rectocele; Shoulder surgery; Tonsillectomy; Vaginal Wall Repair         Medication List  Calcium 600 600 mg calcium (1,500 mg) oral tablet; citalopram 20 mg oral tablet; diltiazem HCl 240 mg oral capsule,extended release 24 hr; indapamide 2.5 mg oral tablet; irbesartan 300 mg oral tablet; levothyroxine 112 mcg oral tablet; lorazepam 1 mg oral tablet; multivitamin oral capsule; PreserVision  "AREDS oral; Protonix 20 mg oral tablet,delayed release (DR/EC); Vitamin B-12 sublingual; Vitamin D3 2,000 unit oral capsule         Allergy List  hydrochlorothiazide; Lidocaine; Marcaine; SULFA (SULFONAMIDES)       Allergies Reconciled  Family Medical History  Heart Disease; Diabetes, unspecified type; Depression; Hypertension         Social History  Active but no formal exercise; Advance Care Plan/Surrogate Decision Maker scanned into EMR; Advance directive declined by patient; Alcohol (Former); Tobacco (Never)         Immunizations  Name Date Admin   Hepatitis A    Hepatitis A    Influenza    Influenza    Influenza          Review of Systems  · Constitutional  o Denies  o : chills, fever  · Cardiovascular  o Denies  o : chest pain, irregular heart beats  · Respiratory  o Denies  o : cough, shortness of breath  · Gastrointestinal  o Admits  o : see HPI   · Endocrine  o Denies  o : weight gain, weight loss      Vitals  Date Time BP Position Site L\R Cuff Size HR RR TEMP (F) WT  HT  BMI kg/m2 BSA m2 O2 Sat HC       08/26/2020 08:59 /61 Sitting      98.1 195lbs 4oz 5'  7.5\" 30.13 2.05           Physical Examination  · Constitutional  o Appearance  o : Healthy-appearing, awake and alert in no acute distress  · Head and Face  o Head  o : Normocephalic with no worriesome skin lesions  · Eyes  o Vision  o :   § Visual Fields  § : eyes move symmetrical in all directions  o Sclerae  o : sclerae anicteric  o Pupils and Irises  o : pupils equal and symmetrical  · Neck  o Inspection/Palpation  o : Trachea is midline, no adenopathy  · Respiratory  o Respiratory Effort  o : Breathing is unlabored.  o Inspection of Chest  o : normal appearance  o Auscultation of Lungs  o : Chest is clear to auscultation bilaterally.  · Cardiovascular  o Heart  o :   § Auscultation of Heart  § : no murmurs, rubs, or gallops  o Peripheral Vascular System  o :   § Extremities  § : no cyanosis, clubbing or edema; "   · Gastrointestinal  o Abdominal Examination  o : Abdomen is soft, nontender to palpation, with normal active bowel sounds, no appreciable hepatosplenomegaly.  o Digital Rectal Exam  o : deferred  · Skin and Subcutaneous Tissue  o General Inspection  o : without focal lesions; turgor is normal  · Psychiatric  o General  o : Alert and oriented x3  o Mood and Affect  o : Mood and affect are appropriate to circumstances  · Extremities  o Extremities  o : No edema, no cyanosis          Assessment  · Gastroesophageal Reflux     530.81/K21.9    Problems Reconciled  Plan  · Medications  o Protonix 20 mg oral tablet,delayed release (DR/EC)   SIG: take 1 tablet (20 mg) by oral route once daily for 90 days   DISP: (90) tablets with 3 refills  Refilled on 08/26/2020     o Medications have been Reconciled  o Transition of Care or Provider Policy  · Instructions  o Information given on current diagnoses.  · Disposition  o Follow up PRN - Call if any change in bowel pattern, abdominal pain, rectal bleeding, or any new GI complaint.            Electronically Signed by: MARGARITA Rose -Author on August 27, 2020 10:13:32 PM

## 2021-05-14 VITALS
HEART RATE: 75 BPM | SYSTOLIC BLOOD PRESSURE: 138 MMHG | BODY MASS INDEX: 30.76 KG/M2 | DIASTOLIC BLOOD PRESSURE: 70 MMHG | TEMPERATURE: 97 F | OXYGEN SATURATION: 98 % | HEIGHT: 67 IN | WEIGHT: 196 LBS

## 2021-05-14 VITALS
TEMPERATURE: 97.6 F | OXYGEN SATURATION: 97 % | HEART RATE: 80 BPM | HEIGHT: 67 IN | WEIGHT: 198 LBS | SYSTOLIC BLOOD PRESSURE: 120 MMHG | DIASTOLIC BLOOD PRESSURE: 72 MMHG | BODY MASS INDEX: 31.08 KG/M2

## 2021-05-14 VITALS
WEIGHT: 196 LBS | HEIGHT: 67 IN | DIASTOLIC BLOOD PRESSURE: 72 MMHG | TEMPERATURE: 97.4 F | SYSTOLIC BLOOD PRESSURE: 124 MMHG | BODY MASS INDEX: 30.76 KG/M2

## 2021-05-14 VITALS
WEIGHT: 195.25 LBS | BODY MASS INDEX: 30.64 KG/M2 | HEIGHT: 67 IN | DIASTOLIC BLOOD PRESSURE: 61 MMHG | TEMPERATURE: 98.1 F | SYSTOLIC BLOOD PRESSURE: 133 MMHG

## 2021-05-14 NOTE — PROGRESS NOTES
Progress Note      Patient Name: Shalonda Hart   Patient ID: [id number]   Sex: Female   YOB: 1950    Primary Care Provider: Alo Viramontes III MD   Referring Provider: Alo Vriamontes III MD    Visit Date: March 29, 2021    Provider: Alo Viramontes III MD   Location: [facility]   Location Address: [address] [place] [address]  [id number]   Location Phone: [phone]          Chief Complaint  · 6 month follow up anxiety medication       History Of Present Illness  Shalonda Hart is a 70 year old /White female who presents for evaluation and treatment of: here today for 6 month follow up anxiety medication. Pt is status post cataract surgery 2/2021 and 3/2021, vaginal prolapse, rectocele, cystocele repair 7/2020 and sling placement 11/2020. Pt see's Dr. Rendon for annual breast and pelvic.      HPI     patient 70 years old just had a sling rocedure  and have to be loosened.    The patient has history of diverticulitis, hypertension, hypothyroidism, irritable bowel, low back pain and status post a gastric sleeve    review of systems     cardiovascular no chest pain no palpitations  Respiratory no shortness of breath no dyspnea exertion   250 cc residual now not leaking  ENT having both eyes lasered for a film with sclera.  GYN sees Dr. Kessler new  Musculoskeletal normal DEXA 2019    Physical exam     blood pressure 120/72 temperature 97.6 weight is 198 pulse ox 97 rate is 80    General no distress  Cardiovascular regular rhythm no murmur   Respiratory no increased work of breathing lungs clear and equal bilaterally     assessment     #1 hypertension   #2 controlled IBS is quiesced sent at the time   #3 is urinary retention status after the sling procedure sling will be loosened some    Plan     recheck 6 months   needs blood work           Past Medical History  Disease Name Date Onset Notes   Anxiety --  --    Arthritis --  --    Depression  09/03/2015 --    Diverticulitis --  --    Esophageal dysphagia --  --    Essential hypertension 09/03/2015 --    Fatty liver --  --    Gastroesophageal Reflux Disorder --  --    History of cystocele 08/25/2020 repaired 7/2020   History of DVT (deep vein thrombosis) 01/08/2019 --    History of repair of rectocele 08/25/2020 repaired 7/2020   Hypertension --  --    Hypothyroid 07/05/2018 --    Irritable bowel syndrome (IBS) --  --    Low back pain 06/02/2014 --    Medication management 07/05/2018 --    Monitoring long-term use medication 09/03/2015 --    Morbid obesity due to excess calories --  --    Sinus congestion/allergies --  --    Skin lesion 09/03/2015 --    Status post gastric bypass for obesity 03/21/2019 --          Past Surgical History  Procedure Name Date Notes   Appendectomy 1969 --    Cesarian Section 1980, 1982 --    Cholecystectomy 1989 --    Colonoscopy 2017 --    Cystocele 07/15/2020 --    Gastric Sleeve 2019 --    Hysterectomy 1984 --    Rectocele 07/15/2020 --    Shoulder surgery 2001 left shoulder   Tonsillectomy 1968 --    Vaginal Wall Repair 07/15/2020 vaginal vault lift         Medication List  Name Date Started Instructions   Calcium 600 600 mg calcium (1,500 mg) oral tablet  --    CARTIA  MG CAPSULE 02/24/2021 TAKE ONE CAPSULE BY MOUTH DAILY   citalopram 20 mg oral tablet 08/25/2020 TAKE ONE TABLET BY MOUTH DAILY   cyclobenzaprine 10 mg oral tablet 10/20/2020 take 1 tablet (10 mg) by oral route 3 times per day   diltiazem HCl 240 mg oral capsule,extended release 24 hr 08/25/2020 TAKE ONE CAPSULE BY MOUTH DAILY   hydrocodone-acetaminophen 5-325 mg oral tablet 10/22/2020 take 1 tablet by oral route every 4 hours as needed for pain   indapamide 2.5 mg oral tablet 08/25/2020 TAKE ONE TABLET BY MOUTH EVERY MORNING   irbesartan 300 mg oral tablet 03/17/2021 TAKE ONE TABLET BY MOUTH DAILY   levothyroxine 112 mcg oral tablet 08/25/2020 take 1 tablet (112 mcg) by oral route once daily for 90  days   lorazepam 1 mg oral tablet 08/25/2020 take 1 to 2 tablet by oral route BID PRN for 90 days   multivitamin oral capsule  --    PreserVision AREDS oral  --    Protonix 20 mg oral tablet,delayed release (/EC) 08/26/2020 take 1 tablet (20 mg) by oral route once daily for 90 days   Vitamin B-12 sublingual  --    Vitamin D3 2,000 unit oral capsule  take 1 capsule by oral route daily         Allergy List  Allergen Name Date Reaction Notes   hydrochlorothiazide --  --  --    Lidocaine --  --  --    Marcaine --  --  --    SULFA (SULFONAMIDES) --  --  --        Allergies Reconciled  Family Medical History  Disease Name Relative/Age Notes   Heart Disease Mother/   --    Diabetes, unspecified type Mother/  Sister/   --    Depression Mother/  Sister/   --    Hypertension Brother/  Father/  Mother/  Sister/   --          Social History  Finding Status Start/Stop Quantity Notes   Active but no formal exercise --  --/-- --  --    Advance Care Plan/Surrogate Decision Maker scanned into EMR --  --/-- --  --    Advance directive declined by patient --  --/-- --  --    Alcohol Former --/-- --  06/07/2017 - denies alcohol use    Tobacco Never --/-- --  --          Immunizations  NameDate Admin Mfg Trade Name Lot Number Route Inj VIS Given VIS Publication   Hepatitis A02/18/2019 NE Not Entered  IM NE 02/21/2019    Comments: given at [facility]   Hepatitis A05/12/2018 NE HAVRIX-ADULT  NE NE 05/16/2018    Comments: given at [facility]   Aasbqctrn09/20/2020 PMC Fluzone Quadrivalent [id number] IM RD 10/20/2020 08/15/2019   Comments: NDC [id number]         Review of Systems  · Constitutional  o * See HPI  · Eyes  o * See HPI  · HENT  o * See HPI  · Breasts  o * See HPI  · Cardiovascular  o * See HPI  · Respiratory  o * See HPI  · Gastrointestinal  o * See HPI  · Genitourinary  o * See HPI  · Integument  o * See HPI  · Neurologic  o * See HPI  · Musculoskeletal  o * See HPI  · Endocrine  o * See HPI  · Psychiatric  o * See  "HPI  · Heme-Lymph  o * See HPI  · Allergic-Immunologic  o * See HPI      Vitals  Date Time BP Position Site L\R Cuff Size HR RR TEMP (F) WT  HT  BMI kg/m2 BSA m2 O2 Sat FR L/min FiO2 HC       03/29/2021 11:57 /72 Sitting    80 - R  97.6 198lbs 0oz 5'  7.5\" 30.55 2.07 97 %  21%              Results  · In-Office Procedures  o Lab procedure  § IOP - Urine Drug Screen In-House [facility] (83371)   § Amphetamines Ur Ql: Negative   § Barbiturates Ur Ql: Negative   § Buprenorphine+Nor Ur Ql Scn: Negative   § Benzodiaz Ur Ql: Negative   § Cocaine Ur Ql: Negative   § Methadone Ur Ql: Negative   § Methamphet Ur Ql: Negative   § MDMA Ur Ql Scn: Negative   § Opiates Ur Ql: Negative   § Oxycodone Ur Ql: Negative   § PCP Ur Ql: Negative   § THC Ur Ql: Negative   § Temp in Range?: Within/Acceptable   § Control Seen?: Yes       Assessment  · Encounter for screening for cardiovascular disorders     V81.2/Z13.6  · History of cystocele     V13.09/Z87.448  repaired 7/2020  · History of repair of rectocele     V45.89/Z98.890  repaired 7/2020  · Medication management     V58.69/Z79.899  · Status post gastric bypass for obesity     V45.86/Z98.84  · Anxiety     300.02/F41.1  · Arthritis     716.90/M19.90  · Hypertension     401.9/I10  · Fatty liver     573.8  · Hypothyroid     244.9/E03.9  · Irritable bowel syndrome (IBS)     564.1/K58.9  · History of cataract extraction     V45.61/Z98.49  · History of bladder surgery     V45.89/Z98.890  · Routine adult health maintenance     V70.0/Z00.00  · History of gastric polyp     V12.79/Z87.19    Problems Reconciled  Plan  · Orders  o Physical, Primary Care Panel (CBC, CMP, Lipid, TSH) [facility] (55081, 40682, 02345, 69228) - V81.2/Z13.6, V58.69/Z79.899, 244.9/E03.9 - 03/29/2021  o ACO-39: Current medications updated and reviewed (1159F, ) - - 03/29/2021  · Medications  o citalopram 20 mg oral tablet   SIG: TAKE ONE TABLET BY MOUTH DAILY   DISP: (90) Tablet with 1 refills  Refilled on 03/29/2021 "     o cyclobenzaprine 10 mg oral tablet   SIG: take 1 tablet (10 mg) by oral route 3 times per day   DISP: (30) Tablet with 1 refills  Refilled on 03/29/2021     o indapamide 2.5 mg oral tablet   SIG: TAKE ONE TABLET BY MOUTH EVERY MORNING   DISP: (90) Tablet with 1 refills  Refilled on 03/29/2021     o irbesartan 300 mg oral tablet   SIG: TAKE ONE TABLET BY MOUTH DAILY   DISP: (90) Tablet with 1 refills  Refilled on 03/29/2021     o levothyroxine 112 mcg oral tablet   SIG: take 1 tablet (112 mcg) by oral route once daily for 90 days   DISP: (90) Tablet with 1 refills  Refilled on 03/29/2021     o lorazepam 1 mg oral tablet   SIG: take 1 to 2 tablet by oral route BID PRN for 90 days   DISP: (180) Tablet with 1 refills  Refilled on 03/29/2021     o CARTIA  MG CAPSULE   SIG: TAKE ONE CAPSULE BY MOUTH DAILY   DISP: (90) Capsule with 0 refills  Discontinued on 03/29/2021     o hydrocodone-acetaminophen 5-325 mg oral tablet   SIG: take 1 tablet by oral route every 4 hours as needed for pain   DISP: (30) Tablet with 0 refills  Discontinued on 03/29/2021     o Medications have been Reconciled  o Transition of Care or Provider Policy  · Instructions  o Patient is taking medications as prescribed and doing well.   o Take all medications as prescribed/directed.  o Patient instructed/educated on their diet and exercise program.  o Patient was educated/instructed on their diagnosis, treatment and medications prior to discharge from the clinic today.  o Bring all medicines with their bottles to each office visit.  o Time spent with the patient was 30 minutes, more than 50% face to face.  o Chronic conditions reviewed and taken into consideration for today's treatment plan.  o Discussed Covid-19 precautions including, but not limited to, social distancing, avoid touching your face, and hand washing.             Electronically Signed by: Gabrielle Lancaster, -Author on March 30, 2021 11:00:14 PM  Electronically Co-signed by: Alo  GERRY Viramontes III MD -Reviewer on March 31, 2021 10:33:16 AM

## 2021-05-15 VITALS
BODY MASS INDEX: 31.08 KG/M2 | WEIGHT: 198 LBS | DIASTOLIC BLOOD PRESSURE: 60 MMHG | SYSTOLIC BLOOD PRESSURE: 118 MMHG | HEIGHT: 67 IN

## 2021-05-15 VITALS
DIASTOLIC BLOOD PRESSURE: 60 MMHG | WEIGHT: 213 LBS | BODY MASS INDEX: 33.43 KG/M2 | SYSTOLIC BLOOD PRESSURE: 122 MMHG | HEIGHT: 67 IN

## 2021-05-15 VITALS
WEIGHT: 196 LBS | TEMPERATURE: 98 F | OXYGEN SATURATION: 97 % | SYSTOLIC BLOOD PRESSURE: 130 MMHG | HEIGHT: 67 IN | HEART RATE: 66 BPM | DIASTOLIC BLOOD PRESSURE: 70 MMHG | BODY MASS INDEX: 30.76 KG/M2

## 2021-05-15 VITALS
BODY MASS INDEX: 29.84 KG/M2 | WEIGHT: 190.12 LBS | SYSTOLIC BLOOD PRESSURE: 129 MMHG | HEIGHT: 67 IN | DIASTOLIC BLOOD PRESSURE: 73 MMHG

## 2021-05-15 VITALS
SYSTOLIC BLOOD PRESSURE: 120 MMHG | WEIGHT: 193 LBS | DIASTOLIC BLOOD PRESSURE: 70 MMHG | HEIGHT: 67 IN | BODY MASS INDEX: 30.29 KG/M2

## 2021-05-15 VITALS
BODY MASS INDEX: 30.76 KG/M2 | WEIGHT: 196 LBS | DIASTOLIC BLOOD PRESSURE: 70 MMHG | TEMPERATURE: 97.4 F | SYSTOLIC BLOOD PRESSURE: 128 MMHG | HEIGHT: 67 IN

## 2021-05-15 VITALS
HEIGHT: 67 IN | DIASTOLIC BLOOD PRESSURE: 72 MMHG | WEIGHT: 193 LBS | BODY MASS INDEX: 30.29 KG/M2 | SYSTOLIC BLOOD PRESSURE: 138 MMHG

## 2021-05-15 VITALS
SYSTOLIC BLOOD PRESSURE: 121 MMHG | HEIGHT: 67 IN | DIASTOLIC BLOOD PRESSURE: 56 MMHG | BODY MASS INDEX: 33.35 KG/M2 | WEIGHT: 212.5 LBS

## 2021-05-16 VITALS
DIASTOLIC BLOOD PRESSURE: 72 MMHG | WEIGHT: 240 LBS | HEIGHT: 67 IN | BODY MASS INDEX: 37.67 KG/M2 | SYSTOLIC BLOOD PRESSURE: 130 MMHG

## 2021-05-16 VITALS
BODY MASS INDEX: 35.31 KG/M2 | SYSTOLIC BLOOD PRESSURE: 128 MMHG | WEIGHT: 225 LBS | HEIGHT: 67 IN | DIASTOLIC BLOOD PRESSURE: 76 MMHG

## 2021-05-16 VITALS
DIASTOLIC BLOOD PRESSURE: 60 MMHG | HEART RATE: 70 BPM | WEIGHT: 239.25 LBS | BODY MASS INDEX: 37.55 KG/M2 | SYSTOLIC BLOOD PRESSURE: 127 MMHG | HEIGHT: 67 IN

## 2021-05-16 VITALS
SYSTOLIC BLOOD PRESSURE: 130 MMHG | BODY MASS INDEX: 36.88 KG/M2 | DIASTOLIC BLOOD PRESSURE: 62 MMHG | HEIGHT: 67 IN | WEIGHT: 235 LBS

## 2021-05-16 VITALS
BODY MASS INDEX: 38.3 KG/M2 | WEIGHT: 244 LBS | DIASTOLIC BLOOD PRESSURE: 62 MMHG | HEIGHT: 67 IN | SYSTOLIC BLOOD PRESSURE: 130 MMHG

## 2021-05-16 VITALS
DIASTOLIC BLOOD PRESSURE: 60 MMHG | HEIGHT: 67 IN | WEIGHT: 237.25 LBS | SYSTOLIC BLOOD PRESSURE: 135 MMHG | BODY MASS INDEX: 37.24 KG/M2

## 2021-08-23 RX ORDER — DILTIAZEM HYDROCHLORIDE 240 MG/1
CAPSULE, COATED, EXTENDED RELEASE ORAL
Qty: 90 CAPSULE | Refills: 0 | Status: SHIPPED | OUTPATIENT
Start: 2021-08-23 | End: 2021-09-29 | Stop reason: SDUPTHER

## 2021-09-20 RX ORDER — PANTOPRAZOLE SODIUM 20 MG/1
TABLET, DELAYED RELEASE ORAL
Qty: 90 TABLET | Refills: 0 | Status: SHIPPED | OUTPATIENT
Start: 2021-09-20 | End: 2021-12-16

## 2021-09-28 RX ORDER — LEVOTHYROXINE SODIUM 112 UG/1
TABLET ORAL
Qty: 90 TABLET | OUTPATIENT
Start: 2021-09-28

## 2021-09-28 RX ORDER — INDAPAMIDE 2.5 MG/1
TABLET, FILM COATED ORAL
Qty: 90 TABLET | OUTPATIENT
Start: 2021-09-28

## 2021-09-28 RX ORDER — CITALOPRAM 20 MG/1
TABLET ORAL
Qty: 90 TABLET | OUTPATIENT
Start: 2021-09-28

## 2021-09-29 ENCOUNTER — OFFICE VISIT (OUTPATIENT)
Dept: FAMILY MEDICINE CLINIC | Facility: CLINIC | Age: 71
End: 2021-09-29

## 2021-09-29 VITALS
DIASTOLIC BLOOD PRESSURE: 62 MMHG | OXYGEN SATURATION: 98 % | HEART RATE: 82 BPM | SYSTOLIC BLOOD PRESSURE: 132 MMHG | HEIGHT: 67 IN | TEMPERATURE: 97.5 F | BODY MASS INDEX: 31.08 KG/M2 | WEIGHT: 198 LBS

## 2021-09-29 DIAGNOSIS — F41.9 ANXIETY: ICD-10-CM

## 2021-09-29 DIAGNOSIS — Z79.899 MEDICATION MANAGEMENT: Primary | ICD-10-CM

## 2021-09-29 PROBLEM — E03.9 HYPOTHYROID: Status: ACTIVE | Noted: 2018-07-05

## 2021-09-29 PROBLEM — Z98.84 HX OF BARIATRIC SURGERY: Status: ACTIVE | Noted: 2021-09-29

## 2021-09-29 LAB
AMPHET+METHAMPHET UR QL: NEGATIVE
BARBITURATES UR QL SCN: NEGATIVE
BENZODIAZ UR QL SCN: NEGATIVE
CANNABINOIDS SERPL QL: NEGATIVE
COCAINE UR QL: NEGATIVE
METHADONE UR QL SCN: NEGATIVE
OPIATES UR QL: NEGATIVE
OXYCODONE UR QL SCN: NEGATIVE

## 2021-09-29 PROCEDURE — 80307 DRUG TEST PRSMV CHEM ANLYZR: CPT | Performed by: FAMILY MEDICINE

## 2021-09-29 PROCEDURE — 99213 OFFICE O/P EST LOW 20 MIN: CPT | Performed by: FAMILY MEDICINE

## 2021-09-29 RX ORDER — CYCLOBENZAPRINE HCL 10 MG
10 TABLET ORAL
COMMUNITY
End: 2021-09-29 | Stop reason: SDUPTHER

## 2021-09-29 RX ORDER — CYCLOBENZAPRINE HCL 10 MG
10 TABLET ORAL 3 TIMES DAILY PRN
Qty: 60 TABLET | Refills: 5 | Status: SHIPPED | OUTPATIENT
Start: 2021-09-29 | End: 2022-04-27 | Stop reason: SDUPTHER

## 2021-09-29 RX ORDER — DOCUSATE CALCIUM 240 MG
240 CAPSULE ORAL 2 TIMES DAILY
COMMUNITY

## 2021-09-29 RX ORDER — LEVOTHYROXINE SODIUM 112 UG/1
112 TABLET ORAL DAILY
Qty: 90 TABLET | Refills: 1 | Status: SHIPPED | OUTPATIENT
Start: 2021-09-29 | End: 2022-03-28

## 2021-09-29 RX ORDER — CITALOPRAM 20 MG/1
20 TABLET ORAL DAILY
Qty: 90 TABLET | Refills: 1 | Status: SHIPPED | OUTPATIENT
Start: 2021-09-29 | End: 2022-03-28

## 2021-09-29 RX ORDER — DILTIAZEM HYDROCHLORIDE 240 MG/1
240 CAPSULE, COATED, EXTENDED RELEASE ORAL DAILY
Qty: 90 CAPSULE | Refills: 1 | Status: SHIPPED | OUTPATIENT
Start: 2021-09-29 | End: 2022-04-27 | Stop reason: SDUPTHER

## 2021-09-29 RX ORDER — LEVOTHYROXINE SODIUM 112 UG/1
TABLET ORAL
COMMUNITY
Start: 2021-06-24 | End: 2021-09-29 | Stop reason: SDUPTHER

## 2021-09-29 RX ORDER — FEXOFENADINE HCL 180 MG/1
180 TABLET ORAL DAILY PRN
COMMUNITY

## 2021-09-29 RX ORDER — LORAZEPAM 1 MG/1
TABLET ORAL
COMMUNITY
Start: 2021-08-12 | End: 2021-09-29 | Stop reason: SDUPTHER

## 2021-09-29 RX ORDER — ANTIOX #8/OM3/DHA/EPA/LUT/ZEAX 250-2.5 MG
1 CAPSULE ORAL
COMMUNITY
End: 2021-12-08

## 2021-09-29 RX ORDER — INDAPAMIDE 2.5 MG/1
2.5 TABLET, FILM COATED ORAL DAILY
Qty: 90 TABLET | Refills: 1 | Status: SHIPPED | OUTPATIENT
Start: 2021-09-29 | End: 2022-03-28

## 2021-09-29 RX ORDER — INDAPAMIDE 2.5 MG/1
TABLET, FILM COATED ORAL
COMMUNITY
Start: 2021-06-24 | End: 2021-09-29 | Stop reason: SDUPTHER

## 2021-09-29 RX ORDER — FOLIC ACID/MULTIVIT,IRON,MINER .4-18-35
1 TABLET,CHEWABLE ORAL DAILY
COMMUNITY

## 2021-09-29 RX ORDER — LORAZEPAM 1 MG/1
1-2 TABLET ORAL 2 TIMES DAILY
Qty: 180 TABLET | Refills: 5 | Status: SHIPPED | OUTPATIENT
Start: 2021-09-29 | End: 2022-04-27 | Stop reason: SDUPTHER

## 2021-09-29 RX ORDER — IRBESARTAN 300 MG/1
300 TABLET ORAL DAILY
Qty: 90 TABLET | Refills: 1 | Status: SHIPPED | OUTPATIENT
Start: 2021-09-29 | End: 2022-04-27 | Stop reason: SDUPTHER

## 2021-09-29 RX ORDER — HYDROCODONE BITARTRATE AND ACETAMINOPHEN 5; 325 MG/1; MG/1
1 TABLET ORAL
COMMUNITY
End: 2021-12-08

## 2021-09-29 RX ORDER — CITALOPRAM 20 MG/1
TABLET ORAL
COMMUNITY
Start: 2021-06-25 | End: 2021-09-29 | Stop reason: SDUPTHER

## 2021-09-29 RX ORDER — IRBESARTAN 300 MG/1
TABLET ORAL
COMMUNITY
Start: 2021-09-12 | End: 2021-09-29 | Stop reason: SDUPTHER

## 2021-09-29 RX ORDER — LANOLIN ALCOHOL/MO/W.PET/CERES
1000 CREAM (GRAM) TOPICAL DAILY
COMMUNITY

## 2021-09-29 NOTE — PROGRESS NOTES
Chief Complaint  Med Management (6 month follow up anxiety medication) and Back Pain    Subjective          Shalonda Hart presents to [facility]  History of Present Illness  71-year-old here for 6-month follow-up anxiety medicine hypertension check history of gastric sleeve lost 50 pounds history of fatty liver    Allergies   Allergen Reactions   • Hydrochlorothiazide Swelling     ORAL SWELLING/SORES   • Sulfa Antibiotics Rash     SWELLING/RASH   • Latex Rash     BLISTERS   • Lidocaine Rash and Swelling     TOPICAL        Past Surgical History:   • APPENDECTOMY   •  SECTION   • CHOLECYSTECTOMY   • COLONOSCOPY   • CYSTOCELE REPAIR   • GASTRIC SLEEVE LAPAROSCOPIC   • HYSTERECTOMY   • OTHER SURGICAL HISTORY    rectocele   • SHOULDER SURGERY   • TONSILLECTOMY   • VAGINAL REPAIR    vaginal wall lift       Social History     Tobacco Use   • Smoking status: Never Smoker   • Smokeless tobacco: Never Used   Substance Use Topics   • Alcohol use: Not Currently     Comment: occasional glass of wine       Family History   Problem Relation Age of Onset   • Heart disease Mother    • Diabetes Mother    • Depression Mother    • Hypertension Mother    • Hypertension Father    • Diabetes Sister    • Depression Sister    • Hypertension Sister    • Hypertension Brother         Health Maintenance Due   Topic Date Due   • Pneumococcal Vaccine 65+ (1 of 2 - PPSV23) Never done   • COVID-19 Vaccine (1) Never done   • TDAP/TD VACCINES (1 - Tdap) Never done   • HEPATITIS C SCREENING  Never done   • ANNUAL WELLNESS VISIT  Never done      Last Completed Colonoscopy          COLORECTAL CANCER SCREENING  Next due on 2019  Outside Claim: AL FECAL BLOOD SCRN IMMUNOASSAY    2017  Outside Claim: AL COLONOSCOPY FLX DX W/COLLJ SPEC WHEN PFRMD                Review of Systems   Cardiovascular no chest pain no palpitations  Respiratory no shortness of breath no dyspnea on exertion  "patient is not gotten the coronavirus vaccine talked about getting it at length  GI patient due a colonoscopy next year  Psych patient is doing well with her anxiety takes lorazepam 1 mg twice daily we will continue  Objective Musculoskeletal  Patient fell contused right chest right chest pain and right shoulder pain  Vitals:    09/29/21 0917   BP: 132/62   Pulse: 82   Temp: 97.5 °F (36.4 °C)   SpO2: 98%   Weight: 89.8 kg (198 lb)   Height: 170.2 cm (67\")     Body mass index is 31.01 kg/m².      Physical Exam  General no distress  Musculoskeletal good range of motion right shoulder with some tenderness right ribs are tender contusion of the ribs with her fall cervical muscle tenderness  Psych no evidence of anxiety or depression  Cardiovascular regular rhythm no murmur  Respiratory lungs clear and equal bilaterally  Result Review :              Assessment and Plan    Generalized anxiety well treated with lorazepam  Continue #2 contusions right chest ribs and shoulder and neck strain acute problem will resolve in 3 weeks or so not taking coronavirus needs to go on and take that now flu shot next month Unseld about diet patient is lost 50 pounds with her gastric sleeve should have help to fatty liver also              Patient was given instructions and counseling regarding her condition or for health maintenance advice. Please see specific information pulled into the AVS if appropriate.     "

## 2021-12-09 ENCOUNTER — TELEPHONE (OUTPATIENT)
Dept: ORTHOPEDIC SURGERY | Facility: CLINIC | Age: 71
End: 2021-12-09

## 2021-12-09 NOTE — TELEPHONE ENCOUNTER
AVULSION FRACTURE OF LEFT TALUS- PRG. NOTES- 12/08/21- IMAGING- 12/08/21-LEFT ANKLE XRAY, LEFT FOOT XRAY AT [facility]

## 2021-12-10 ENCOUNTER — OFFICE VISIT (OUTPATIENT)
Dept: ORTHOPEDIC SURGERY | Facility: CLINIC | Age: 71
End: 2021-12-10

## 2021-12-10 VITALS — OXYGEN SATURATION: 97 % | BODY MASS INDEX: 31.71 KG/M2 | WEIGHT: 202 LBS | HEART RATE: 84 BPM | HEIGHT: 67 IN

## 2021-12-10 DIAGNOSIS — S93.402A SPRAIN OF LEFT ANKLE, UNSPECIFIED LIGAMENT, INITIAL ENCOUNTER: Primary | ICD-10-CM

## 2021-12-10 PROCEDURE — 99203 OFFICE O/P NEW LOW 30 MIN: CPT | Performed by: STUDENT IN AN ORGANIZED HEALTH CARE EDUCATION/TRAINING PROGRAM

## 2021-12-10 NOTE — PROGRESS NOTES
"Chief Complaint  Pain of the Left Ankle    Subjective          Shalonda Hart presents to [facility] for an evaluation of left ankle.     History of Present Illness    Patient had gone down the steps when she had fallen and injured her left ankle in the process. She had immediate pain in the left ankle. She got up and had gone about her day as normal. She drove to [place] and came back home when she noticed swelling in the ankle. Ankle swelling got worse the following day. Injury sustained on 12/7/21. She states she doesn't have much pain but she has soreness in the ankle. Urgent Care placed her into a boot and told her not to weight bear until she was evaluated further by orthopedics. She is using a rollator walker as a make-shift scooter.     Allergies   Allergen Reactions   • Hydrochlorothiazide Swelling     ORAL SWELLING/SORES   • Sulfa Antibiotics Rash     SWELLING/RASH   • Tape Other (See Comments)     Pt gets blisters with plastic tape.   • Lidocaine Rash and Swelling     TOPICAL        Social History     Socioeconomic History   • Marital status:    Tobacco Use   • Smoking status: Never Smoker   • Smokeless tobacco: Never Used   Vaping Use   • Vaping Use: Never used   Substance and Sexual Activity   • Alcohol use: Not Currently     Comment: occasional glass of wine   • Drug use: Never        I reviewed the patient's chief complaint, history of present illness, review of systems, past medical history, surgical history, family history, social history, medications, and allergy list.     REVIEW OF SYSTEMS    Constitutional: Denies fevers, chills, weight loss  Cardiovascular: Denies chest pain, shortness of breath  Skin: Denies rashes, acute skin changes  Neurologic: Denies headache, loss of consciousness  MSK: Left ankle pain      Objective   Vital Signs:   Pulse 84   Ht 170.2 cm (67\")   Wt 91.6 kg (202 lb)   SpO2 97%   BMI 31.64 kg/m²     Body mass index is 31.64 " kg/m².    Physical Exam    LEFT ANKLE: No wounds. Mild residual swelling. Tender over the dorsal mid foot. Non-tender over the forefoot. Achilles intact. Tender lateral border of the foot. Calf soft. No pain with syndesmotic squeeze. Intact ankle plantar flexion and dorsiflexion.     Procedures    Imaging Results (Most Recent)     None         PROCEDURE:  XR FOOT 3+ VW LEFT, 12/08/2021, 17:01  XR ANKLE 3+ VW LEFT, 12/08/2021, 17:03     COMPARISON: [facility], CR, ANKLE >OR= 3V LT, 8/28/2012, 8:25.     INDICATIONS:  swelling/pain after fall yesterday     FINDINGS:          Cortical irregularity along the superior aspect of the anterior talus could represent an avulsion   type fracture.     Mild degenerative change consistent with osteoarthritis is seen in the tibiotalar joint, tarsal   region, 1st MTP joint, and interphalangeal joints.     No lytic or sclerotic bone lesions are seen.     Small plantar spur is evident.     No foreign body is seen.     Mild soft tissue swelling is seen over the lateral malleolus.     CONCLUSION:   Left foot and ankle series demonstrating cortical irregularity along the superior aspect of the   anterior talus which could represent avulsion type fracture.               MELLISA OLSEN MD         Electronically Signed and Approved By: MELLISA OLSEN MD on 12/08/2021 at 17:19             Assessment and Plan    Diagnoses and all orders for this visit:    1. Sprain of left ankle  (Primary)        Discussed treatment plans and diagnosis with the patient. Patient to continue the fracture boot with weightbearing as tolerated. She is to come out of the boot and work on ankle range of motion, these were demonstrated in office. Ice and elevate the foot and ankle to help with the swelling. Patient may bear weight as tolerated with the walking boot. A prescription for a knee scooter provided to help with ambulation.     Call or return if symptoms worsen or patient has any concerns.    Will obtain X-Rays of left ankle at next visit.     Scribed for Flo Steinberg MD by Tory Julio  12/10/2021   09:45 EST         Follow Up   Return in about 4 weeks (around 1/7/2022).  Patient was given instructions and counseling regarding her condition or for health maintenance advice. Please see specific information pulled into the AVS if appropriate.       I have personally performed the services described in this document as scribed by the above individual and it is both accurate and complete.     Flo Steinberg MD  12/10/21  09:54 EST

## 2021-12-16 RX ORDER — PANTOPRAZOLE SODIUM 20 MG/1
TABLET, DELAYED RELEASE ORAL
Qty: 90 TABLET | Refills: 0 | Status: SHIPPED | OUTPATIENT
Start: 2021-12-16 | End: 2022-03-14

## 2021-12-27 ENCOUNTER — OFFICE VISIT (OUTPATIENT)
Dept: ORTHOPEDIC SURGERY | Facility: CLINIC | Age: 71
End: 2021-12-27

## 2021-12-27 VITALS — OXYGEN SATURATION: 95 % | BODY MASS INDEX: 31.71 KG/M2 | HEIGHT: 67 IN | WEIGHT: 202 LBS | HEART RATE: 66 BPM

## 2021-12-27 DIAGNOSIS — S93.402A SPRAIN OF LEFT ANKLE, UNSPECIFIED LIGAMENT, INITIAL ENCOUNTER: Primary | ICD-10-CM

## 2021-12-27 DIAGNOSIS — M25.572 LEFT ANKLE PAIN, UNSPECIFIED CHRONICITY: ICD-10-CM

## 2021-12-27 PROCEDURE — 99213 OFFICE O/P EST LOW 20 MIN: CPT | Performed by: PHYSICIAN ASSISTANT

## 2021-12-27 NOTE — PROGRESS NOTES
"Chief Complaint  Pain of the Left Ankle    Subjective          Shalonda Hart presents to [facility] for   History of Present Illness    Shalonda presents today for follow-up of her left ankle pain.  She has a left ankle sprain with an anterior talus avulsion type fracture that we are treating nonoperatively.  Initial injury 12/8/2021.  Today, she reports she is doing well.  Patient has been wearing her walking boot without any complications.  She reports minimal pain.  She has been using a knee scooter when walking longer distances.  She affirms to working on range of motion exercises at least 3 times a day.  She continues to ice and elevate as needed.  She is taking Tylenol for pain.  She denies numbness or tingling.  Denies new injuries.      Allergies   Allergen Reactions   • Hydrochlorothiazide Swelling     ORAL SWELLING/SORES   • Sulfa Antibiotics Rash     SWELLING/RASH   • Tape Other (See Comments)     Pt gets blisters with plastic tape.   • Lidocaine Rash and Swelling     TOPICAL        Social History     Socioeconomic History   • Marital status:    Tobacco Use   • Smoking status: Never Smoker   • Smokeless tobacco: Never Used   Vaping Use   • Vaping Use: Never used   Substance and Sexual Activity   • Alcohol use: Not Currently     Comment: occasional glass of wine   • Drug use: Never        I reviewed the patient's chief complaint, history of present illness, review of systems, past medical history, surgical history, family history, social history, medications, and allergy list.     REVIEW OF SYSTEMS    Constitutional: Denies fevers, chills, weight loss  Cardiovascular: Denies chest pain, shortness of breath  Skin: Denies rashes, acute skin changes  Neurologic: Denies headache, loss of consciousness  MSK: Left ankle pain      Objective   Vital Signs:   Pulse 66   Ht 170.2 cm (67\")   Wt 91.6 kg (202 lb)   SpO2 95%   BMI 31.64 kg/m²     Body mass index is 31.64 " kg/m².    Physical Exam    General: Alert.  No acute distress.  Left lower extremity: Resolving bruising.  Mild swelling.  Tenderness to palpation over the mid dorsal foot.  Nontender over the remaining foot. Demonstrates active ankle plantarflexion and dorsiflexion without pain.  No pain with syndesmotic squeeze test.  Calf soft, nontender.  Sensation intact to the dorsal and plantar foot.  Palpable pedal pulses.      Procedures    Imaging Results (Most Recent)     Procedure Component Value Units Date/Time    XR Ankle 2 View Left [[id number]] Resulted: 12/27/21 0916     Updated: 12/27/21 0917    Narrative:      Indications: Follow-up left ankle injury    Views: AP, oblique, lateral left ankle    Findings: Avulsion type fracture of the anterior talus again seen.    Fracture appears stable.  Joint remains anatomically aligned.  No   additional fractures are noted.    Comparative Data: Comparative data found and reviewed today.                 Assessment and Plan    Diagnoses and all orders for this visit:    1. Sprain of left ankle  (Primary)    2. Left ankle pain, unspecified chronicity  -     XR Ankle 2 View Amelia Liz presents today for follow-up of her left ankle sprain and anterior talus avulsion type fracture.  Initial injury 12/8/2021.  Patient has been treated nonoperatively in a walking boot.  X-rays were reviewed and are stable today.  Today, she has decreased pain and increased range of motion.  Patient will continue with the walking boot for an additional 2 weeks before transitioning to an ankle brace.  Weight-bear as tolerated in the walking boot.  Continue to ice and elevate as needed.  Continue with home exercises working on range of motion.  Follow-up in 2 weeks for reevaluation.  We will obtain new x-rays of the left ankle at next visit.        Call or return if symptoms worsen or patient has any concerns.   Will obtain X-Rays of left ankle at next visit.       Follow Up   Return in about 2  weeks (around 1/10/2022).  Patient was given instructions and counseling regarding her condition or for health maintenance advice. Please see specific information pulled into the AVS if appropriate.     Lita Mao PA-C  12/27/21  09:18 EST

## 2022-01-10 ENCOUNTER — OFFICE VISIT (OUTPATIENT)
Dept: ORTHOPEDIC SURGERY | Facility: CLINIC | Age: 72
End: 2022-01-10

## 2022-01-10 VITALS — BODY MASS INDEX: 31.71 KG/M2 | WEIGHT: 202 LBS | HEART RATE: 72 BPM | HEIGHT: 67 IN | OXYGEN SATURATION: 98 %

## 2022-01-10 DIAGNOSIS — S93.402A SPRAIN OF LEFT ANKLE, UNSPECIFIED LIGAMENT, INITIAL ENCOUNTER: Primary | ICD-10-CM

## 2022-01-10 DIAGNOSIS — M25.572 LEFT ANKLE PAIN, UNSPECIFIED CHRONICITY: ICD-10-CM

## 2022-01-10 PROCEDURE — 99213 OFFICE O/P EST LOW 20 MIN: CPT | Performed by: PHYSICIAN ASSISTANT

## 2022-01-10 NOTE — PROGRESS NOTES
"Chief Complaint  Pain of the Left Ankle  {Labs  Result Review  Imaging  Med Tab  Media  Procedures :23}   Subjective      Shalonda Hart presents to [facility] for ***    Allergies   Allergen Reactions   • Hydrochlorothiazide Swelling     ORAL SWELLING/SORES   • Sulfa Antibiotics Rash     SWELLING/RASH   • Tape Other (See Comments)     Pt gets blisters with plastic tape.   • Lidocaine Rash and Swelling     TOPICAL        Social History     Socioeconomic History   • Marital status:    Tobacco Use   • Smoking status: Never Smoker   • Smokeless tobacco: Never Used   Vaping Use   • Vaping Use: Never used   Substance and Sexual Activity   • Alcohol use: Not Currently     Comment: occasional glass of wine   • Drug use: Never        Review of Systems   Constitutional: Negative.    HENT: Negative.    Eyes: Negative.    Respiratory: Negative.    Cardiovascular: Negative.    Endocrine: Negative.    Musculoskeletal: Positive for arthralgias.   Skin: Negative.    Allergic/Immunologic: Negative.    Neurological: Negative.    Hematological: Negative.    Psychiatric/Behavioral: Negative.        Objective   Vital Signs:   Pulse 72   Ht 170.2 cm (67\")   Wt 91.6 kg (202 lb)   SpO2 98%   BMI 31.64 kg/m²       Physical Exam  Constitutional:       Appearance: Normal appearance. He is well-developed and normal weight.   HENT:      Head: Normocephalic.      Right Ear: Hearing and external ear normal.      Left Ear: Hearing and external ear normal.      Nose: Nose normal.   Eyes:      Conjunctiva/sclera: Conjunctivae normal.   Cardiovascular:      Rate and Rhythm: Normal rate.   Pulmonary:      Effort: Pulmonary effort is normal.      Breath sounds: No wheezing or rales.   Abdominal:      Palpations: Abdomen is soft.      Tenderness: There is no abdominal tenderness.   Musculoskeletal:      Cervical back: Normal range of motion.   Skin:     Findings: No rash.   Neurological:      Mental " Status: He is alert and oriented to person, place, and time.   Psychiatric:         Mood and Affect: Mood and affect normal.         Judgment: Judgment normal.     Ortho Exam   ***    Procedures      Result Review :{Labs  Result Review  Imaging  Med Tab  Media  Procedures :23}       Imaging Results (Most Recent)     None                Assessment and Plan     DX:   No diagnosis found.     {Ortho Plan:95143}    There are no Patient Instructions on file for this visit.     Follow Up     No follow-ups on file.        Patient was given instructions and counseling regarding her condition or for health maintenance advice. Please see specific information pulled into the AVS if appropriate.      Lita Mao PA-C   01/10/22   08:14 EST

## 2022-01-10 NOTE — PROGRESS NOTES
"Chief Complaint  Pain of the Left Ankle    Subjective          Shalonda Hart presents to [facility] for   History of Present Illness    Shalonda presents today for follow-up of her left ankle pain.  Patient has a left ankle sprain with an anterior talus avulsion type fracture with initial injury on 12/8/2021.  Today, patient denies pain.  She states that she has been wearing her boot when ambulating the majority of the time.  She does take her boot off to work on range of motion, ice, and elevate as needed.  She reports that her swelling has decreased.  She denies numbness or tingling.  Denies new injuries.        Allergies   Allergen Reactions   • Hydrochlorothiazide Swelling     ORAL SWELLING/SORES   • Sulfa Antibiotics Rash     SWELLING/RASH   • Tape Other (See Comments)     Pt gets blisters with plastic tape.   • Lidocaine Rash and Swelling     TOPICAL        Social History     Socioeconomic History   • Marital status:    Tobacco Use   • Smoking status: Never Smoker   • Smokeless tobacco: Never Used   Vaping Use   • Vaping Use: Never used   Substance and Sexual Activity   • Alcohol use: Not Currently     Comment: occasional glass of wine   • Drug use: Never        I reviewed the patient's chief complaint, history of present illness, review of systems, past medical history, surgical history, family history, social history, medications, and allergy list.     REVIEW OF SYSTEMS    Constitutional: Denies fevers, chills, weight loss  Cardiovascular: Denies chest pain, shortness of breath  Skin: Denies rashes, acute skin changes  Neurologic: Denies headache, loss of consciousness  MSK: Left ankle pain      Objective   Vital Signs:   Pulse 72   Ht 170.2 cm (67\")   Wt 91.6 kg (202 lb)   SpO2 98%   BMI 31.64 kg/m²     Body mass index is 31.64 kg/m².    Physical Exam    General: Alert.  No acute distress.  Left lower extremity: No bruising.  No swelling.  No tenderness to the " medial or lateral malleolus.  No areas of tenderness on palpation of the foot.  Demonstrates active ankle plantarflexion dorsiflexion without pain.  Able to wiggle all toes.  No pain with syndesmotic squeeze test.  Ankle stable to ligamentous stress.  Calf soft, nontender.  Sensation intact to the dorsal and plantar foot.  Palpable pedal pulses.    Procedures    Imaging Results (Most Recent)     Procedure Component Value Units Date/Time    XR Ankle 3+ View Left [[id number]] Resulted: 01/10/22 1028     Updated: 01/10/22 1028    Narrative:      Indications: Follow-up left ankle anterior talus avulsion fracture    Views: AP, oblique, lateral left ankle    Findings: Anterior talus avulsion type fracture again seen.  Fracture   remains stable.  Talus remains well reduced beneath the tibia.  Joint   remains anatomically aligned.  No additional fractures are noted.    Comparative Data: Comparative data found and reviewed today.               Assessment and Plan    Diagnoses and all orders for this visit:    1. Sprain of left ankle  (Primary)    2. Left ankle pain, unspecified chronicity  -     XR Ankle 3+ View Amelia Liz presents today for follow-up of her left ankle sprain and anterior talus avulsion type fracture with initial injury on 12/8/2021.  X-rays were reviewed with the patient today.  X-rays appear stable.  Patient was provided an ankle brace today.  She is advised to wear the ankle brace over the next 1 to 2 weeks and then slowly transition out of the brace as she feels comfortable.  Patient to continue with home exercises to increased strength of her ankle.  Continue to ice and elevate as needed.  Follow-up in 4 weeks for reevaluation.  We will obtain new x-rays of the left ankle at next visit.      Call or return if symptoms worsen or patient has any concerns.   Will obtain X-Rays of left ankle at next visit.         Follow Up   Return in about 4 weeks (around 2/7/2022).  Patient was given instructions  and counseling regarding her condition or for health maintenance advice. Please see specific information pulled into the AVS if appropriate.     Lita Mao PA-C  01/10/22  10:35 EST

## 2022-02-11 ENCOUNTER — OFFICE VISIT (OUTPATIENT)
Dept: ORTHOPEDIC SURGERY | Facility: CLINIC | Age: 72
End: 2022-02-11

## 2022-02-11 VITALS — HEART RATE: 60 BPM | BODY MASS INDEX: 31.39 KG/M2 | OXYGEN SATURATION: 98 % | WEIGHT: 200 LBS | HEIGHT: 67 IN

## 2022-02-11 DIAGNOSIS — M25.572 LEFT ANKLE PAIN, UNSPECIFIED CHRONICITY: ICD-10-CM

## 2022-02-11 DIAGNOSIS — S93.402A SPRAIN OF LEFT ANKLE, UNSPECIFIED LIGAMENT, INITIAL ENCOUNTER: Primary | ICD-10-CM

## 2022-02-11 PROCEDURE — 99213 OFFICE O/P EST LOW 20 MIN: CPT | Performed by: PHYSICIAN ASSISTANT

## 2022-02-11 NOTE — PROGRESS NOTES
"Chief Complaint  Pain of the Left Ankle    Subjective          Shalonda Hart presents to [facility] for   History of Present Illness    Shalonda presents today for follow-up of her left ankle.  Patient has a left ankle sprain with an anterior talus avulsion type fracture with initial injury 12/8/2021.  Today, patient reports no pain.  She states that she has swelling on occasion if she does a lot of walking.  She will take Tylenol as needed.  She states that she has been wearing her brace when she is out of the house and doing extensive walking, but otherwise has been ambulating without the brace.  She states that her foot and ankle feels stable.  She denies new injuries.  Denies numbness or tingling.    Allergies   Allergen Reactions   • Hydrochlorothiazide Swelling     ORAL SWELLING/SORES   • Sulfa Antibiotics Rash     SWELLING/RASH   • Tape Other (See Comments)     Pt gets blisters with plastic tape.   • Lidocaine Rash and Swelling     TOPICAL        Social History     Socioeconomic History   • Marital status:    Tobacco Use   • Smoking status: Never Smoker   • Smokeless tobacco: Never Used   Vaping Use   • Vaping Use: Never used   Substance and Sexual Activity   • Alcohol use: Not Currently     Comment: occasional glass of wine   • Drug use: Never        I reviewed the patient's chief complaint, history of present illness, review of systems, past medical history, surgical history, family history, social history, medications, and allergy list.     REVIEW OF SYSTEMS    Constitutional: Denies fevers, chills, weight loss  Cardiovascular: Denies chest pain, shortness of breath  Skin: Denies rashes, acute skin changes  Neurologic: Denies headache, loss of consciousness  MSK: Left ankle pain      Objective   Vital Signs:   Pulse 60   Ht 170.2 cm (67\")   Wt 90.7 kg (200 lb)   SpO2 98%   BMI 31.32 kg/m²     Body mass index is 31.32 kg/m².    Physical Exam    General: Alert.  No " acute distress.  Left lower extremity: No swelling.  No areas of tenderness to palpation of the ankle or foot.  No pain with syndesmotic squeeze test.  Calf soft, nontender.  Ankle stable to ligamentous stress.  Achilles intact.  Demonstrates full active ankle plantarflexion and dorsiflexion.  Sensation intact over the dorsal and plantar foot.  Palpable pedal pulses.    Procedures    Imaging Results (Most Recent)     Procedure Component Value Units Date/Time    XR Ankle 3+ View Left [[id number]] Resulted: 02/11/22 0821     Updated: 02/11/22 0823    Narrative:      Indications: Follow-up left anterior talus avulsion fracture    Views: AP, oblique, lateral left ankle.    Findings: Anterior talus avulsion type fracture again visualized.    Fracture remains stable.  All joints remain anatomically aligned.  Talus   remains well reduced beneath the tibia.  No additional fractures are   noted.    Comparative Data: Comparative data found and reviewed today.               Assessment and Plan    Diagnoses and all orders for this visit:    1. Sprain of left ankle  (Primary)    2. Left ankle pain, unspecified chronicity  -     XR Ankle 3+ View Left      Shalonda presents today for follow-up of her left ankle sprain and anterior talus avulsion type fracture with initial injury 12/8/2021.  X-rays are stable today.  Okay for patient to wear ankle brace as needed.  Continue to ice and elevate as needed for swelling.  Okay to take Tylenol as needed for pain.  Okay to return to activities as tolerated.  Continue with home exercises to work on ankle strengthening.  Follow-up as needed.    Call or return if symptoms worsen or patient has any concerns.       Follow Up   Return if symptoms worsen or fail to improve.  Patient was given instructions and counseling regarding her condition or for health maintenance advice. Please see specific information pulled into the AVS if appropriate.     Lita Mao PA-C  02/11/22  08:24 EST

## 2022-03-14 RX ORDER — PANTOPRAZOLE SODIUM 20 MG/1
TABLET, DELAYED RELEASE ORAL
Qty: 90 TABLET | Refills: 0 | Status: SHIPPED | OUTPATIENT
Start: 2022-03-14 | End: 2022-04-27 | Stop reason: SDUPTHER

## 2022-03-26 DIAGNOSIS — F33.0 MILD EPISODE OF RECURRENT MAJOR DEPRESSIVE DISORDER: ICD-10-CM

## 2022-03-26 DIAGNOSIS — E03.9 ACQUIRED HYPOTHYROIDISM: Primary | ICD-10-CM

## 2022-03-28 RX ORDER — LEVOTHYROXINE SODIUM 112 UG/1
TABLET ORAL
Qty: 90 TABLET | Refills: 0 | Status: SHIPPED | OUTPATIENT
Start: 2022-03-28 | End: 2022-04-27 | Stop reason: SDUPTHER

## 2022-03-28 RX ORDER — INDAPAMIDE 2.5 MG/1
TABLET, FILM COATED ORAL
Qty: 90 TABLET | Refills: 0 | Status: SHIPPED | OUTPATIENT
Start: 2022-03-28 | End: 2022-04-27 | Stop reason: SDUPTHER

## 2022-03-28 RX ORDER — CITALOPRAM 20 MG/1
TABLET ORAL
Qty: 90 TABLET | Refills: 0 | Status: SHIPPED | OUTPATIENT
Start: 2022-03-28 | End: 2022-04-27 | Stop reason: SDUPTHER

## 2022-04-27 ENCOUNTER — OFFICE VISIT (OUTPATIENT)
Dept: FAMILY MEDICINE CLINIC | Facility: CLINIC | Age: 72
End: 2022-04-27

## 2022-04-27 VITALS
SYSTOLIC BLOOD PRESSURE: 120 MMHG | HEART RATE: 74 BPM | BODY MASS INDEX: 31.39 KG/M2 | DIASTOLIC BLOOD PRESSURE: 60 MMHG | WEIGHT: 200 LBS | HEIGHT: 67 IN | TEMPERATURE: 97.9 F | OXYGEN SATURATION: 97 %

## 2022-04-27 DIAGNOSIS — Z01.89 ROUTINE LAB DRAW: ICD-10-CM

## 2022-04-27 DIAGNOSIS — G89.29 CHRONIC LOW BACK PAIN, UNSPECIFIED BACK PAIN LATERALITY, UNSPECIFIED WHETHER SCIATICA PRESENT: ICD-10-CM

## 2022-04-27 DIAGNOSIS — Z13.220 ENCOUNTER FOR LIPID SCREENING FOR CARDIOVASCULAR DISEASE: ICD-10-CM

## 2022-04-27 DIAGNOSIS — K21.00 GASTROESOPHAGEAL REFLUX DISEASE WITH ESOPHAGITIS WITHOUT HEMORRHAGE: ICD-10-CM

## 2022-04-27 DIAGNOSIS — R13.19 ESOPHAGEAL DYSPHAGIA: ICD-10-CM

## 2022-04-27 DIAGNOSIS — F41.9 ANXIETY: ICD-10-CM

## 2022-04-27 DIAGNOSIS — Z79.899 MEDICATION MANAGEMENT: ICD-10-CM

## 2022-04-27 DIAGNOSIS — I10 PRIMARY HYPERTENSION: ICD-10-CM

## 2022-04-27 DIAGNOSIS — Z13.6 ENCOUNTER FOR LIPID SCREENING FOR CARDIOVASCULAR DISEASE: ICD-10-CM

## 2022-04-27 DIAGNOSIS — F33.0 MILD EPISODE OF RECURRENT MAJOR DEPRESSIVE DISORDER: ICD-10-CM

## 2022-04-27 DIAGNOSIS — M54.50 CHRONIC LOW BACK PAIN, UNSPECIFIED BACK PAIN LATERALITY, UNSPECIFIED WHETHER SCIATICA PRESENT: ICD-10-CM

## 2022-04-27 DIAGNOSIS — Z00.00 ROUTINE ADULT HEALTH MAINTENANCE: Primary | ICD-10-CM

## 2022-04-27 DIAGNOSIS — Z11.59 NEED FOR HEPATITIS C SCREENING TEST: ICD-10-CM

## 2022-04-27 DIAGNOSIS — K76.0 FATTY LIVER: ICD-10-CM

## 2022-04-27 DIAGNOSIS — E03.9 ACQUIRED HYPOTHYROIDISM: ICD-10-CM

## 2022-04-27 DIAGNOSIS — K63.5 POLYP OF COLON, UNSPECIFIED PART OF COLON, UNSPECIFIED TYPE: ICD-10-CM

## 2022-04-27 PROBLEM — N81.11 MIDLINE CYSTOCELE: Status: ACTIVE | Noted: 2022-04-27

## 2022-04-27 PROBLEM — E66.01 MORBID (SEVERE) OBESITY DUE TO EXCESS CALORIES: Status: ACTIVE | Noted: 2019-01-28

## 2022-04-27 PROBLEM — N81.9 VAGINAL VAULT PROLAPSE: Status: ACTIVE | Noted: 2020-06-03

## 2022-04-27 PROBLEM — N81.6 RECTOCELE: Status: ACTIVE | Noted: 2022-04-27

## 2022-04-27 PROBLEM — Z98.890 HISTORY OF SUBURETHRAL SLING PROCEDURE: Status: ACTIVE | Noted: 2022-04-27

## 2022-04-27 LAB
ALBUMIN SERPL-MCNC: 4.7 G/DL (ref 3.5–5.2)
ALBUMIN/GLOB SERPL: 1.8 G/DL
ALP SERPL-CCNC: 71 U/L (ref 39–117)
ALT SERPL W P-5'-P-CCNC: 24 U/L (ref 1–33)
AMPHET+METHAMPHET UR QL: NEGATIVE
AMPHETAMINE INTERNAL CONTROL: NORMAL
AMPHETAMINES UR QL: NEGATIVE
ANION GAP SERPL CALCULATED.3IONS-SCNC: 12.1 MMOL/L (ref 5–15)
AST SERPL-CCNC: 19 U/L (ref 1–32)
BARBITURATE INTERNAL CONTROL: NORMAL
BARBITURATES UR QL SCN: NEGATIVE
BASOPHILS # BLD AUTO: 0.04 10*3/MM3 (ref 0–0.2)
BASOPHILS NFR BLD AUTO: 0.5 % (ref 0–1.5)
BENZODIAZ UR QL SCN: NEGATIVE
BENZODIAZEPINE INTERNAL CONTROL: NORMAL
BILIRUB SERPL-MCNC: 0.4 MG/DL (ref 0–1.2)
BUN SERPL-MCNC: 11 MG/DL (ref 8–23)
BUN/CREAT SERPL: 14.9 (ref 7–25)
BUPRENORPHINE INTERNAL CONTROL: NORMAL
BUPRENORPHINE SERPL-MCNC: NEGATIVE NG/ML
CALCIUM SPEC-SCNC: 9.9 MG/DL (ref 8.6–10.5)
CANNABINOIDS SERPL QL: NEGATIVE
CHLORIDE SERPL-SCNC: 101 MMOL/L (ref 98–107)
CHOLEST SERPL-MCNC: 185 MG/DL (ref 0–200)
CO2 SERPL-SCNC: 24.9 MMOL/L (ref 22–29)
COCAINE INTERNAL CONTROL: NORMAL
COCAINE UR QL: NEGATIVE
CREAT SERPL-MCNC: 0.74 MG/DL (ref 0.57–1)
DEPRECATED RDW RBC AUTO: 43.4 FL (ref 37–54)
EGFRCR SERPLBLD CKD-EPI 2021: 87.2 ML/MIN/1.73
EOSINOPHIL # BLD AUTO: 0.17 10*3/MM3 (ref 0–0.4)
EOSINOPHIL NFR BLD AUTO: 2.2 % (ref 0.3–6.2)
ERYTHROCYTE [DISTWIDTH] IN BLOOD BY AUTOMATED COUNT: 12.7 % (ref 12.3–15.4)
EXPIRATION DATE: NORMAL
GLOBULIN UR ELPH-MCNC: 2.6 GM/DL
GLUCOSE SERPL-MCNC: 104 MG/DL (ref 65–99)
HCT VFR BLD AUTO: 38.2 % (ref 34–46.6)
HCV AB SER DONR QL: NORMAL
HDLC SERPL-MCNC: 49 MG/DL (ref 40–60)
HGB BLD-MCNC: 12.8 G/DL (ref 12–15.9)
IMM GRANULOCYTES # BLD AUTO: 0.02 10*3/MM3 (ref 0–0.05)
IMM GRANULOCYTES NFR BLD AUTO: 0.3 % (ref 0–0.5)
LDLC SERPL CALC-MCNC: 113 MG/DL (ref 0–100)
LDLC/HDLC SERPL: 2.24 {RATIO}
LYMPHOCYTES # BLD AUTO: 3.13 10*3/MM3 (ref 0.7–3.1)
LYMPHOCYTES NFR BLD AUTO: 40.2 % (ref 19.6–45.3)
Lab: NORMAL
MCH RBC QN AUTO: 31.4 PG (ref 26.6–33)
MCHC RBC AUTO-ENTMCNC: 33.5 G/DL (ref 31.5–35.7)
MCV RBC AUTO: 93.9 FL (ref 79–97)
MDMA (ECSTASY) INTERNAL CONTROL: NORMAL
MDMA UR QL SCN: NEGATIVE
METHADONE INTERNAL CONTROL: NORMAL
METHADONE UR QL SCN: NEGATIVE
METHAMPHETAMINE INTERNAL CONTROL: NORMAL
MONOCYTES # BLD AUTO: 0.61 10*3/MM3 (ref 0.1–0.9)
MONOCYTES NFR BLD AUTO: 7.8 % (ref 5–12)
NEUTROPHILS NFR BLD AUTO: 3.81 10*3/MM3 (ref 1.7–7)
NEUTROPHILS NFR BLD AUTO: 49 % (ref 42.7–76)
NRBC BLD AUTO-RTO: 0 /100 WBC (ref 0–0.2)
OPIATES INTERNAL CONTROL: NORMAL
OPIATES UR QL: NEGATIVE
OXYCODONE INTERNAL CONTROL: NORMAL
OXYCODONE UR QL SCN: NEGATIVE
PCP UR QL SCN: NEGATIVE
PHENCYCLIDINE INTERNAL CONTROL: NORMAL
PLATELET # BLD AUTO: 282 10*3/MM3 (ref 140–450)
PMV BLD AUTO: 11 FL (ref 6–12)
POTASSIUM SERPL-SCNC: 4.3 MMOL/L (ref 3.5–5.2)
PROT SERPL-MCNC: 7.3 G/DL (ref 6–8.5)
RBC # BLD AUTO: 4.07 10*6/MM3 (ref 3.77–5.28)
SODIUM SERPL-SCNC: 138 MMOL/L (ref 136–145)
THC INTERNAL CONTROL: NORMAL
TRIGL SERPL-MCNC: 131 MG/DL (ref 0–150)
TSH SERPL DL<=0.05 MIU/L-ACNC: 1.99 UIU/ML (ref 0.27–4.2)
VLDLC SERPL-MCNC: 23 MG/DL (ref 5–40)
WBC NRBC COR # BLD: 7.78 10*3/MM3 (ref 3.4–10.8)

## 2022-04-27 PROCEDURE — 80061 LIPID PANEL: CPT | Performed by: FAMILY MEDICINE

## 2022-04-27 PROCEDURE — 80053 COMPREHEN METABOLIC PANEL: CPT | Performed by: FAMILY MEDICINE

## 2022-04-27 PROCEDURE — 86803 HEPATITIS C AB TEST: CPT | Performed by: FAMILY MEDICINE

## 2022-04-27 PROCEDURE — 80305 DRUG TEST PRSMV DIR OPT OBS: CPT | Performed by: FAMILY MEDICINE

## 2022-04-27 PROCEDURE — 36415 COLL VENOUS BLD VENIPUNCTURE: CPT | Performed by: FAMILY MEDICINE

## 2022-04-27 PROCEDURE — 84443 ASSAY THYROID STIM HORMONE: CPT | Performed by: FAMILY MEDICINE

## 2022-04-27 PROCEDURE — 99214 OFFICE O/P EST MOD 30 MIN: CPT | Performed by: FAMILY MEDICINE

## 2022-04-27 PROCEDURE — 85025 COMPLETE CBC W/AUTO DIFF WBC: CPT | Performed by: FAMILY MEDICINE

## 2022-04-27 RX ORDER — PANTOPRAZOLE SODIUM 20 MG/1
20 TABLET, DELAYED RELEASE ORAL DAILY
Qty: 90 TABLET | Refills: 3 | Status: SHIPPED | OUTPATIENT
Start: 2022-04-27

## 2022-04-27 RX ORDER — IRBESARTAN 300 MG/1
300 TABLET ORAL DAILY
Qty: 90 TABLET | Refills: 3 | Status: SHIPPED | OUTPATIENT
Start: 2022-04-27

## 2022-04-27 RX ORDER — LEVOTHYROXINE SODIUM 112 UG/1
112 TABLET ORAL DAILY
Qty: 90 TABLET | Refills: 3 | Status: SHIPPED | OUTPATIENT
Start: 2022-04-27

## 2022-04-27 RX ORDER — CITALOPRAM 20 MG/1
20 TABLET ORAL DAILY
Qty: 90 TABLET | Refills: 3 | Status: SHIPPED | OUTPATIENT
Start: 2022-04-27

## 2022-04-27 RX ORDER — DILTIAZEM HYDROCHLORIDE 240 MG/1
240 CAPSULE, COATED, EXTENDED RELEASE ORAL DAILY
Qty: 90 CAPSULE | Refills: 3 | Status: SHIPPED | OUTPATIENT
Start: 2022-04-27

## 2022-04-27 RX ORDER — LORAZEPAM 1 MG/1
1-2 TABLET ORAL 2 TIMES DAILY
Qty: 180 TABLET | Refills: 5 | Status: SHIPPED | OUTPATIENT
Start: 2022-04-27 | End: 2022-11-10 | Stop reason: SDUPTHER

## 2022-04-27 RX ORDER — ANTIOX #8/OM3/DHA/EPA/LUT/ZEAX 250-2.5 MG
1 CAPSULE ORAL 2 TIMES DAILY
COMMUNITY

## 2022-04-27 RX ORDER — CARBOXYMETHYLCELLULOSE SODIUM 5 MG/ML
SOLUTION/ DROPS OPHTHALMIC 3 TIMES DAILY PRN
COMMUNITY

## 2022-04-27 RX ORDER — INDAPAMIDE 2.5 MG/1
2.5 TABLET, FILM COATED ORAL DAILY
Qty: 90 TABLET | Refills: 3 | Status: SHIPPED | OUTPATIENT
Start: 2022-04-27

## 2022-04-27 RX ORDER — CYCLOBENZAPRINE HCL 10 MG
10 TABLET ORAL 3 TIMES DAILY PRN
Qty: 60 TABLET | Refills: 5 | Status: SHIPPED | OUTPATIENT
Start: 2022-04-27

## 2022-04-27 NOTE — PROGRESS NOTES
Chief Complaint  Med Management (Routine check and yearly lab work), Hypothyroidism (Taking levothyroxine 112mcg and doing well), Depression (Taking citalopram daily), Hypertension, Heartburn, and Anxiety (6 month follow up. Taking lorazepam 1 mg)    SUBJECTIVE  Shalonda Hart presents to [facility]    Hypertension generalized anxiety hypothyroidism on levothyroxine mild depression status post gastric sleeve patient doing reasonably well    PAST MEDICAL HISTORY  Allergies   Allergen Reactions   • Hydrochlorothiazide Swelling     ORAL SWELLING/SORES   • Sulfa Antibiotics Rash     SWELLING/RASH   • Tape Other (See Comments)     Pt gets blisters with plastic tape.   • Lidocaine Swelling and Rash     Can not use TOPICAL LIDOCAINE. Does ok with the injectable lidocaine.          Past Surgical History:   • APPENDECTOMY   •  SECTION   • CHOLECYSTECTOMY   • COLONOSCOPY   • CYSTOCELE REPAIR   • GASTRIC SLEEVE LAPAROSCOPIC   • HYSTERECTOMY   • OTHER SURGICAL HISTORY    rectocele   • SHOULDER SURGERY   • TONSILLECTOMY   • VAGINAL REPAIR    vaginal wall lift       Social History     Tobacco Use   • Smoking status: Never Smoker   • Smokeless tobacco: Never Used   Substance Use Topics   • Alcohol use: Not Currently     Comment: occasional glass of wine       Family History   Problem Relation Age of Onset   • Heart disease Mother    • Diabetes Mother    • Depression Mother    • Hypertension Mother    • Hypertension Father    • Diabetes Sister    • Depression Sister    • Hypertension Sister    • Hypertension Brother         Health Maintenance Due   Topic Date Due   • Pneumococcal Vaccine 65+ (1 - PCV) Never done   • TDAP/TD VACCINES (1 - Tdap) Never done   • HEPATITIS C SCREENING  Never done   • ANNUAL WELLNESS VISIT  Never done        Last Completed Colonoscopy          COLORECTAL CANCER SCREENING (COLONOSCOPY - Every 5 Years) Next due on 2019  Outside Claim: [place] [facility]  "BLOOD SCRN IMMUNOASSAY    08/17/2018  SCANNED - COLONOSCOPY    08/16/2017  Outside Claim: [place] COLONOSCOPY FLX DX W/COLLJ SPEC WHEN PFRMD                REVIEW OF SYSTEMS    Cardiovascular no chest pain or palpitations  Respiratory no shortness of breath no dyspnea exertion      OBJECTIVE  Vitals:    04/27/22 0946   BP: 120/60   Pulse: 74   Temp: 97.9 °F (36.6 °C)   SpO2: 97%   Weight: 90.7 kg (200 lb)   Height: 170.2 cm (67\")     Body mass index is 31.32 kg/m².    PHYSICAL EXAM  General no distress  Cardiovascular regular rhythm no murmur  Respiratory muscular equal bilaterally  Abdomen soft nontender no upper splenomegaly      ASSESSMENT & PLAN  Diagnoses and all orders for this visit:    1. Routine adult health maintenance (Primary)  -     TSH  -     Comprehensive Metabolic Panel  -     Lipid Panel  -     CBC & Differential  -     POC Urine Drug Screen [facility]    2. Routine lab draw  -     TSH  -     Comprehensive Metabolic Panel  -     Lipid Panel  -     CBC & Differential    3. Medication management  -     TSH  -     POC Urine Drug Screen [facility]    4. Need for hepatitis C screening test  -     Hepatitis C Antibody    5. Primary hypertension  -     Comprehensive Metabolic Panel  -     Lipid Panel  -     CBC & Differential  -     dilTIAZem CD (CARDIZEM CD) 240 MG 24 hr capsule; Take 1 capsule by mouth Daily.  Dispense: 90 capsule; Refill: 3  -     indapamide (LOZOL) 2.5 MG tablet; Take 1 tablet by mouth Daily.  Dispense: 90 tablet; Refill: 3  -     irbesartan (AVAPRO) 300 MG tablet; Take 1 tablet by mouth Daily.  Dispense: 90 tablet; Refill: 3    6. Fatty liver  -     Comprehensive Metabolic Panel  -     Lipid Panel  -     CBC & Differential    7. Anxiety  -     TSH  -     POC Urine Drug Screen [facility]  -     LORazepam (ATIVAN) 1 MG tablet; Take 1-2 tablets by mouth 2 (Two) Times a Day.  Dispense: 180 tablet; Refill: 5    8. Acquired hypothyroidism  -     TSH  -     levothyroxine " (SYNTHROID, LEVOTHROID) 112 MCG tablet; Take 1 tablet by mouth Daily.  Dispense: 90 tablet; Refill: 3    9. Esophageal dysphagia  -     CBC & Differential    10. Gastroesophageal reflux disease with esophagitis without hemorrhage  -     Comprehensive Metabolic Panel  -     CBC & Differential  -     pantoprazole (PROTONIX) 20 MG EC tablet; Take 1 tablet by mouth Daily.  Dispense: 90 tablet; Refill: 3    11. Mild episode of recurrent major depressive disorder (HCC)  -     POC Urine Drug Screen [facility]  -     citalopram (CeleXA) 20 MG tablet; Take 1 tablet by mouth Daily.  Dispense: 90 tablet; Refill: 3    12. Encounter for lipid screening for cardiovascular disease  -     Lipid Panel    13. Chronic low back pain, unspecified back pain laterality, unspecified whether sciatica present  -     cyclobenzaprine (FLEXERIL) 10 MG tablet; Take 1 tablet by mouth 3 (Three) Times a Day As Needed for Muscle Spasms.  Dispense: 60 tablet; Refill: 5    14. Polyp of colon, unspecified part of colon, unspecified type  -     Ambulatory Referral to Gastroenterology          Colon polyp needs a colonoscopy we will schedule her to hypertension good control status post gastric sleeve could lose another 15 pounds hypothyroidism needs a TSH continue levothyroxine generalized anxiety continue Ativan            Patient was given instructions and counseling regarding her condition or for health maintenance advice. Please see specific information pulled into the AVS if appropriate.

## 2022-07-18 ENCOUNTER — PREP FOR SURGERY (OUTPATIENT)
Dept: OTHER | Facility: HOSPITAL | Age: 72
End: 2022-07-18

## 2022-07-18 ENCOUNTER — CLINICAL SUPPORT (OUTPATIENT)
Dept: GASTROENTEROLOGY | Facility: CLINIC | Age: 72
End: 2022-07-18

## 2022-07-18 DIAGNOSIS — K63.5 POLYP OF COLON, UNSPECIFIED PART OF COLON, UNSPECIFIED TYPE: Primary | ICD-10-CM

## 2022-07-18 RX ORDER — SODIUM, POTASSIUM,MAG SULFATES 17.5-3.13G
1 SOLUTION, RECONSTITUTED, ORAL ORAL EVERY 12 HOURS
Qty: 354 ML | Refills: 0 | Status: SHIPPED | OUTPATIENT
Start: 2022-07-18 | End: 2022-11-30 | Stop reason: CLARIF

## 2022-07-18 NOTE — PROGRESS NOTES
Shalonda Hart  REASON FOR CALL Screening for colonoscopy   SENT IN [facility]   Past Medical History:   Diagnosis Date   • Anxiety    • Arthritis    • Depression 2015   • Diverticulitis    • Esophageal dysphagia    • Essential hypertension 2015   • Fatty liver    • Gastroesophageal reflux disease    • History of bladder surgery 2021   • History of cataract extraction 2021   • History of cystocele 2020    repaired 2020   • History of DVT (deep vein thrombosis) 2019   • History of gastric polyp 2021   • History of repair of rectocele 2020    repaired 2020   • Hypertension    • Hypothyroid 2018   • Irritable bowel syndrome (IBS)    • Long-term use of high-risk medication 2015   • Low back pain 2014   • Medication management 2018   • Morbid obesity due to excess calories (HCC)    • Sinus congestion     allergies   • Skin lesion 2015   • Status post gastric bypass for obesity 2015     Allergies   Allergen Reactions   • Hydrochlorothiazide Swelling     ORAL SWELLING/SORES   • Sulfa Antibiotics Rash     SWELLING/RASH   • Tape Other (See Comments)     Pt gets blisters with plastic tape.   • Lidocaine Swelling and Rash     Can not use TOPICAL LIDOCAINE. Does ok with the injectable lidocaine.       Past Surgical History:   Procedure Laterality Date   • APPENDECTOMY     •  SECTION     • CHOLECYSTECTOMY     • COLONOSCOPY     • CYSTOCELE REPAIR  07/15/2020   • GASTRIC SLEEVE LAPAROSCOPIC  2019   • HYSTERECTOMY     • OTHER SURGICAL HISTORY  07/15/2020    rectocele   • SHOULDER SURGERY Left    • TONSILLECTOMY  1968   • VAGINAL REPAIR  07/15/2020    vaginal wall lift     Social History     Socioeconomic History   • Marital status:    Tobacco Use   • Smoking status: Never Smoker   • Smokeless tobacco: Never Used   Vaping Use   • Vaping Use: Never used   Substance and Sexual Activity   • Alcohol use:  Not Currently     Comment: occasional glass of wine   • Drug use: Yes     Types: Benzodiazepines     Comment: lorazepam   • Sexual activity: Defer     Family History   Problem Relation Age of Onset   • Heart disease Mother    • Diabetes Mother    • Depression Mother    • Hypertension Mother    • Hypertension Father    • Diabetes Sister    • Depression Sister    • Hypertension Sister    • Hypertension Brother        Current Outpatient Medications:   •  Calcium Citrate-Vitamin D 500-500 MG-UNIT chewable tablet, Chew., Disp: , Rfl:   •  carboxymethylcellulose (REFRESH PLUS) 0.5 % solution, 3 (Three) Times a Day As Needed., Disp: , Rfl:   •  Cholecalciferol 50 MCG (2000 UT) tablet, Take  by mouth., Disp: , Rfl:   •  citalopram (CeleXA) 20 MG tablet, Take 1 tablet by mouth Daily., Disp: 90 tablet, Rfl: 3  •  cyclobenzaprine (FLEXERIL) 10 MG tablet, Take 1 tablet by mouth 3 (Three) Times a Day As Needed for Muscle Spasms., Disp: 60 tablet, Rfl: 5  •  D-MANNOSE PO, Take 600 mg by mouth Daily., Disp: , Rfl:   •  dilTIAZem CD (CARDIZEM CD) 240 MG 24 hr capsule, Take 1 capsule by mouth Daily., Disp: 90 capsule, Rfl: 3  •  docusate calcium (SURFAK) 240 MG capsule, Take 240 mg by mouth 2 (Two) Times a Day., Disp: , Rfl:   •  fexofenadine (ALLEGRA) 180 MG tablet, Take 180 mg by mouth., Disp: , Rfl:   •  indapamide (LOZOL) 2.5 MG tablet, Take 1 tablet by mouth Daily., Disp: 90 tablet, Rfl: 3  •  irbesartan (AVAPRO) 300 MG tablet, Take 1 tablet by mouth Daily., Disp: 90 tablet, Rfl: 3  •  levothyroxine (SYNTHROID, LEVOTHROID) 112 MCG tablet, Take 1 tablet by mouth Daily., Disp: 90 tablet, Rfl: 3  •  LORazepam (ATIVAN) 1 MG tablet, Take 1-2 tablets by mouth 2 (Two) Times a Day., Disp: 180 tablet, Rfl: 5  •  multivitamin-iron-minerals-folic acid (CENTRUM) chewable tablet, Chew 1 tablet Daily., Disp: , Rfl:   •  multivitamins-minerals (PRESERVISION AREDS 2) capsule capsule, Take 1 capsule by mouth 2 (Two) Times a Day., Disp: , Rfl:    •  naphazoline-pheniramine (NAPHCON-A) 0.025-0.3 % ophthalmic solution, 1 drop 4 (Four) Times a Day As Needed., Disp: , Rfl:   •  pantoprazole (PROTONIX) 20 MG EC tablet, Take 1 tablet by mouth Daily., Disp: 90 tablet, Rfl: 3  •  Psyllium (METAMUCIL FIBER PO), Take  by mouth., Disp: , Rfl:   •  saccharomyces boulardii (FLORASTOR) 250 MG capsule, Take 250 mg by mouth., Disp: , Rfl:   •  Simethicone (GAS-X PO), Take  by mouth., Disp: , Rfl:   •  vitamin B-12 (CYANOCOBALAMIN) 1000 MCG tablet, Take 1,000 mcg by mouth Daily., Disp: , Rfl:     Answers for HPI/ROS submitted by the patient on 7/17/2022  Please describe your symptoms.: Follow up for 5year endo and colonoscopy  Have you had these symptoms before?: Yes  How long have you been having these symptoms?: Greater than 2 weeks  Please list any medications you are currently taking for this condition.: Pantoprazole SOD DR 20 mg  Please describe any probable cause for these symptoms. : History of polyps  What is the primary reason for your visit?: Other

## 2022-09-09 ENCOUNTER — TRANSCRIBE ORDERS (OUTPATIENT)
Dept: ADMINISTRATIVE | Facility: HOSPITAL | Age: 72
End: 2022-09-09

## 2022-09-09 DIAGNOSIS — Z12.31 SCREENING MAMMOGRAM FOR BREAST CANCER: Primary | ICD-10-CM

## 2022-11-10 ENCOUNTER — OFFICE VISIT (OUTPATIENT)
Dept: FAMILY MEDICINE CLINIC | Facility: CLINIC | Age: 72
End: 2022-11-10

## 2022-11-10 VITALS
BODY MASS INDEX: 31.08 KG/M2 | HEART RATE: 80 BPM | OXYGEN SATURATION: 99 % | WEIGHT: 198 LBS | SYSTOLIC BLOOD PRESSURE: 134 MMHG | HEIGHT: 67 IN | TEMPERATURE: 97.9 F | DIASTOLIC BLOOD PRESSURE: 60 MMHG

## 2022-11-10 DIAGNOSIS — Z79.899 MEDICATION MANAGEMENT: Primary | ICD-10-CM

## 2022-11-10 DIAGNOSIS — F41.9 ANXIETY: ICD-10-CM

## 2022-11-10 DIAGNOSIS — R35.0 URINARY FREQUENCY: ICD-10-CM

## 2022-11-10 LAB
AMPHET+METHAMPHET UR QL: NEGATIVE
AMPHETAMINE INTERNAL CONTROL: NORMAL
AMPHETAMINES UR QL: NEGATIVE
BARBITURATE INTERNAL CONTROL: NORMAL
BARBITURATES UR QL SCN: NEGATIVE
BENZODIAZ UR QL SCN: NEGATIVE
BENZODIAZEPINE INTERNAL CONTROL: NORMAL
BILIRUB BLD-MCNC: NEGATIVE MG/DL
BUPRENORPHINE INTERNAL CONTROL: NORMAL
BUPRENORPHINE SERPL-MCNC: NEGATIVE NG/ML
CANNABINOIDS SERPL QL: NEGATIVE
CLARITY, POC: CLEAR
COCAINE INTERNAL CONTROL: NORMAL
COCAINE UR QL: NEGATIVE
COLOR UR: YELLOW
EXPIRATION DATE: NORMAL
EXPIRATION DATE: NORMAL
GLUCOSE UR STRIP-MCNC: NEGATIVE MG/DL
KETONES UR QL: NEGATIVE
LEUKOCYTE EST, POC: NEGATIVE
Lab: NORMAL
Lab: NORMAL
MDMA (ECSTASY) INTERNAL CONTROL: NORMAL
MDMA UR QL SCN: NEGATIVE
METHADONE INTERNAL CONTROL: NORMAL
METHADONE UR QL SCN: NEGATIVE
METHAMPHETAMINE INTERNAL CONTROL: NORMAL
NITRITE UR-MCNC: NEGATIVE MG/ML
OPIATES INTERNAL CONTROL: NORMAL
OPIATES UR QL: NEGATIVE
OXYCODONE INTERNAL CONTROL: NORMAL
OXYCODONE UR QL SCN: NEGATIVE
PCP UR QL SCN: NEGATIVE
PH UR: 7 [PH] (ref 5–8)
PHENCYCLIDINE INTERNAL CONTROL: NORMAL
PROT UR STRIP-MCNC: NEGATIVE MG/DL
RBC # UR STRIP: NEGATIVE /UL
SP GR UR: 1.01 (ref 1–1.03)
THC INTERNAL CONTROL: NORMAL
UROBILINOGEN UR QL: NORMAL

## 2022-11-10 PROCEDURE — 81003 URINALYSIS AUTO W/O SCOPE: CPT | Performed by: FAMILY MEDICINE

## 2022-11-10 PROCEDURE — 99213 OFFICE O/P EST LOW 20 MIN: CPT | Performed by: FAMILY MEDICINE

## 2022-11-10 PROCEDURE — 80305 DRUG TEST PRSMV DIR OPT OBS: CPT | Performed by: FAMILY MEDICINE

## 2022-11-10 RX ORDER — LORAZEPAM 1 MG/1
1-2 TABLET ORAL 2 TIMES DAILY
Qty: 180 TABLET | Refills: 5 | Status: SHIPPED | OUTPATIENT
Start: 2022-11-10

## 2022-11-10 RX ORDER — PREDNISONE 20 MG/1
20 TABLET ORAL DAILY
Qty: 7 TABLET | Refills: 0 | Status: SHIPPED | OUTPATIENT
Start: 2022-11-10 | End: 2022-11-17

## 2022-11-10 NOTE — PROGRESS NOTES
Chief Complaint  Anxiety (6 month follow up), Hearing Problem (Difficulty hearing out of right ear), and Urinary Frequency    SUBJECTIVE  Shalonda Hart presents to [facility]    Patient 72 years old generalized anxietyIs Ativan and will continue some urinary frequency urinalysis done here in the office is negative decreased hearing in the right ear after airplane flight    PAST MEDICAL HISTORY  Allergies   Allergen Reactions   • Hydrochlorothiazide Swelling     ORAL SWELLING/SORES   • Sulfa Antibiotics Rash     SWELLING/RASH   • Tape Other (See Comments)     Pt gets blisters with plastic tape.   • Lidocaine Swelling and Rash     Can not use TOPICAL LIDOCAINE. Does ok with the injectable lidocaine.          Past Surgical History:   • APPENDECTOMY   •  SECTION   • CHOLECYSTECTOMY   • COLONOSCOPY   • CYSTOCELE REPAIR   • GASTRIC SLEEVE LAPAROSCOPIC   • HYSTERECTOMY   • OTHER SURGICAL HISTORY    rectocele   • SHOULDER SURGERY   • TONSILLECTOMY   • UPPER GASTROINTESTINAL ENDOSCOPY   • VAGINAL REPAIR    vaginal wall lift       Social History     Tobacco Use   • Smoking status: Never   • Smokeless tobacco: Never   Substance Use Topics   • Alcohol use: Not Currently     Comment: occasional glass of wine       Family History   Problem Relation Age of Onset   • Heart disease Mother    • Diabetes Mother    • Depression Mother    • Hypertension Mother    • Hypertension Father    • Diabetes Sister    • Depression Sister    • Hypertension Sister    • Hypertension Brother         Health Maintenance Due   Topic Date Due   • Pneumococcal Vaccine 65+ (1 - PCV) Never done   • TDAP/TD VACCINES (1 - Tdap) Never done   • ANNUAL WELLNESS VISIT  Never done        Last Completed Colonoscopy     This patient has no relevant Health Maintenance data.          REVIEW OF SYSTEMS  eNT some mild discomfort near no fever decreased hearing the right ear since the plane flight this is a serous  "otitis  Vascular no chest pain no palpitation  Respiratory no shortness of breath no dyspnea exertion  GI patient had a colonoscopy in 2017 OBJECTIVE  Vitals:    11/10/22 1124   BP: 134/60   Pulse: 80   Temp: 97.9 °F (36.6 °C)   SpO2: 99%   Weight: 89.8 kg (198 lb)   Height: 170.2 cm (67\")     Body mass index is 31.01 kg/m².    PHYSICAL EXAM    General no distress  ENT Willett lateralizes to the right slight decreased hearing right mild serous otitis no erythema at all  Cardiovascular regular rhythm no murmur  Lungs clear and equal bilaterally    ASSESSMENT & PLAN  Diagnoses and all orders for this visit:    1. Medication management (Primary)  -     POC Urinalysis Dipstick, Automated  -     POC Urine Drug Screen [facility]    2. Anxiety  -     POC Urine Drug Screen [facility]  -     LORazepam (ATIVAN) 1 MG tablet; Take 1-2 tablets by mouth 2 (Two) Times a Day.  Dispense: 180 tablet; Refill: 5    3. Urinary frequency  -     POC Urinalysis Dipstick, Automated    Other orders  -     predniSONE (DELTASONE) 20 MG tablet; Take 1 tablet by mouth Daily for 7 days.  Dispense: 7 tablet; Refill: 0          Right serous otitis secondary to plane flight continue to insufflate and prednisone 22 urinary urgency urinalysis negative neurolysed anxiety doing well on Ativan we will continue            Patient was given instructions and counseling regarding her condition or for health maintenance advice. Please see specific information pulled into the AVS if appropriate.   Answers for HPI/ROS submitted by the patient on 11/9/2022  Please describe your symptoms.: Medication follow-up , Problem with right ear  Have you had these symptoms before?: No  How long have you been having these symptoms?: 1-4 days  Please list any medications you are currently taking for this condition.: None  Please describe any probable cause for these symptoms. : Feels like fluid in ear  What is the primary reason for your visit?: Other      "

## 2022-11-21 ENCOUNTER — TELEPHONE (OUTPATIENT)
Dept: GASTROENTEROLOGY | Facility: CLINIC | Age: 72
End: 2022-11-21

## 2022-11-22 ENCOUNTER — HOSPITAL ENCOUNTER (OUTPATIENT)
Dept: MAMMOGRAPHY | Facility: HOSPITAL | Age: 72
Discharge: HOME | End: 2022-11-22
Admitting: FAMILY MEDICINE

## 2022-11-22 DIAGNOSIS — Z12.31 SCREENING MAMMOGRAM FOR BREAST CANCER: ICD-10-CM

## 2022-11-22 PROCEDURE — 77067 SCR MAMMO BI INCL CAD: CPT

## 2022-11-22 PROCEDURE — 77063 BREAST TOMOSYNTHESIS BI: CPT

## 2022-11-29 NOTE — TELEPHONE ENCOUNTER
Patient called the office and left a detailed message in regards to scope scheduled on 12/02/2022. Patient stated that she has started vomiting and having diarrhea since Thanksgiving. Also she has general questions about bowel prep. Please call [phone]

## 2022-11-29 NOTE — TELEPHONE ENCOUNTER
Patient states that she is needing a new bowel prep due to costs.   Pt states that she has started vomiting and diarrhea today. Patient wants to know if okay to proceed with procedure on 12/02. Her daughter has tested positive for flu A and she was exposed over the holiday. Please advise.

## 2022-11-30 ENCOUNTER — TELEPHONE (OUTPATIENT)
Dept: GASTROENTEROLOGY | Facility: CLINIC | Age: 72
End: 2022-11-30

## 2022-12-06 ENCOUNTER — LAB REQUISITION (OUTPATIENT)
Dept: LAB | Facility: HOSPITAL | Age: 72
End: 2022-12-06

## 2022-12-06 DIAGNOSIS — R30.0 DYSURIA: ICD-10-CM

## 2022-12-06 PROCEDURE — 87086 URINE CULTURE/COLONY COUNT: CPT | Performed by: OBSTETRICS & GYNECOLOGY

## 2022-12-08 LAB — BACTERIA SPEC AEROBE CULT: NO GROWTH

## 2023-05-10 DIAGNOSIS — F41.9 ANXIETY: ICD-10-CM

## 2023-05-10 RX ORDER — LORAZEPAM 1 MG/1
TABLET ORAL
Qty: 180 TABLET | Refills: 0 | Status: SHIPPED | OUTPATIENT
Start: 2023-05-10

## 2023-05-11 ENCOUNTER — PREP FOR SURGERY (OUTPATIENT)
Dept: OTHER | Facility: HOSPITAL | Age: 73
End: 2023-05-11
Payer: MEDICARE

## 2023-05-11 ENCOUNTER — CLINICAL SUPPORT (OUTPATIENT)
Dept: GASTROENTEROLOGY | Facility: CLINIC | Age: 73
End: 2023-05-11
Payer: MEDICARE

## 2023-05-11 DIAGNOSIS — Z12.11 COLON CANCER SCREENING: Primary | ICD-10-CM

## 2023-05-11 NOTE — PROGRESS NOTES
Shalonda Grullon Tanner  1950  72 y.o.    Reason for call: Screening Colonoscopy  Prep prescribed: Suzie  Prep instructions reviewed with patient and sent to patient via regular mail to the home address on file  Clearance needed? No  If yes, what clearance is needed? N/A  Clearance has been requested from NA  The patient has been scheduled for: Colonoscopy  Family history of colon cancer? No  If yes, indicate relative: NA   Family History   Problem Relation Age of Onset   • Heart disease Mother    • Diabetes Mother    • Depression Mother    • Hypertension Mother    • Hypertension Father    • Diabetes Sister    • Depression Sister    • Hypertension Sister    • Heart disease Sister    • Hypertension Brother    • Heart disease Brother    • Malig Hyperthermia Neg Hx      Past Medical History:   Diagnosis Date   • Anxiety    • Arthritis    • Colon polyp    • Depression 09/03/2015   • Diverticulitis    • Esophageal dysphagia    • Essential hypertension 09/03/2015   • Fatty liver    • Gastroesophageal reflux disease    • History of bladder surgery 03/29/2021   • History of cataract extraction 03/29/2021   • History of cystocele 08/25/2020    repaired 7/2020   • History of DVT (deep vein thrombosis) 01/08/2019   • History of gastric polyp 03/29/2021   • History of repair of rectocele 08/25/2020    repaired 7/2020   • Hypothyroid 07/05/2018   • Irritable bowel syndrome (IBS)    • Long-term use of high-risk medication 09/03/2015   • Low back pain 06/02/2014   • Medication management 07/05/2018   • Morbid obesity due to excess calories    • Sinus congestion     allergies   • Skin lesion 09/03/2015   • Status post gastric bypass for obesity 03/21/2015     Allergies   Allergen Reactions   • Hydrochlorothiazide Swelling     ORAL SWELLING/SORES   • Sulfa Antibiotics Rash     SWELLING/RASH   • Tape Other (See Comments)     Pt gets blisters with plastic tape.   • Lidocaine Swelling and Rash     Can not use TOPICAL LIDOCAINE. Does ok  with the injectable lidocaine.       Past Surgical History:   Procedure Laterality Date   • APPENDECTOMY     •  SECTION  /   • CHOLECYSTECTOMY     • COLONOSCOPY     • CYSTOCELE REPAIR  07/15/2020   • GASTRIC SLEEVE LAPAROSCOPIC     • HYSTERECTOMY     • RECTOCELE REPAIR  07/15/2020   • SHOULDER SURGERY Left    • TONSILLECTOMY     • UPPER GASTROINTESTINAL ENDOSCOPY     • VAGINAL REPAIR  07/15/2020    vaginal wall lift     Social History     Socioeconomic History   • Marital status:    Tobacco Use   • Smoking status: Never   • Smokeless tobacco: Never   Vaping Use   • Vaping Use: Never used   Substance and Sexual Activity   • Alcohol use: Yes     Comment: occasional glass of wine rarely   • Drug use: Yes     Types: Benzodiazepines     Comment: lorazepam   • Sexual activity: Defer       Current Outpatient Medications:   •  Calcium Citrate-Vitamin D 500-500 MG-UNIT chewable tablet, Chew., Disp: , Rfl:   •  carboxymethylcellulose (REFRESH PLUS) 0.5 % solution, 3 (Three) Times a Day As Needed., Disp: , Rfl:   •  Cholecalciferol 50 MCG (2000 UT) tablet, Take  by mouth Daily., Disp: , Rfl:   •  citalopram (CeleXA) 20 MG tablet, Take 1 tablet by mouth Daily., Disp: 90 tablet, Rfl: 3  •  cyclobenzaprine (FLEXERIL) 10 MG tablet, Take 1 tablet by mouth 3 (Three) Times a Day As Needed for Muscle Spasms., Disp: 60 tablet, Rfl: 5  •  D-MANNOSE PO, Take 600 mg by mouth 2 (Two) Times a Day., Disp: , Rfl:   •  dilTIAZem CD (CARDIZEM CD) 240 MG 24 hr capsule, Take 1 capsule by mouth Daily., Disp: 90 capsule, Rfl: 3  •  docusate calcium (SURFAK) 240 MG capsule, Take 1 capsule by mouth 2 (Two) Times a Day., Disp: , Rfl:   •  fexofenadine (ALLEGRA) 180 MG tablet, Take 1 tablet by mouth Daily As Needed., Disp: , Rfl:   •  indapamide (LOZOL) 2.5 MG tablet, Take 1 tablet by mouth Daily., Disp: 90 tablet, Rfl: 3  •  irbesartan (AVAPRO) 300 MG tablet, Take 1 tablet by mouth Daily., Disp: 90  tablet, Rfl: 3  •  levothyroxine (SYNTHROID, LEVOTHROID) 112 MCG tablet, Take 1 tablet by mouth Daily., Disp: 90 tablet, Rfl: 3  •  LORazepam (ATIVAN) 1 MG tablet, TAKE ONE TO TWO TABLETS BY MOUTH TWICE A DAY, Disp: 180 tablet, Rfl: 0  •  multivitamin-iron-minerals-folic acid (CENTRUM) chewable tablet, Chew 1 tablet Daily., Disp: , Rfl:   •  multivitamins-minerals (PRESERVISION AREDS 2) capsule capsule, Take 1 capsule by mouth 2 (Two) Times a Day., Disp: , Rfl:   •  pantoprazole (PROTONIX) 20 MG EC tablet, Take 1 tablet by mouth Daily., Disp: 90 tablet, Rfl: 3  •  Psyllium (METAMUCIL FIBER PO), Take  by mouth At Night As Needed., Disp: , Rfl:   •  saccharomyces boulardii (FLORASTOR) 250 MG capsule, Take 1 capsule by mouth. Only when on antibiotic, Disp: , Rfl:   •  Simethicone (GAS-X PO), Take  by mouth Every 4 (Four) Hours As Needed., Disp: , Rfl:   •  vitamin B-12 (CYANOCOBALAMIN) 1000 MCG tablet, Take 1 tablet by mouth Daily., Disp: , Rfl:   •  polyethylene glycol (GoLYTELY) 236 g solution, Starting at noon on day prior to procedure, drink 8 ounces every 30 minutes until all gone or stools are clear. May add flavor packet. (Patient not taking: Reported on 5/11/2023), Disp: 4000 mL, Rfl: 0    Answers for HPI/ROS submitted by the patient on 5/11/2023  Please describe your symptoms.: Schedule for colonoscopy.  (5 year follow-up due to history of polyps)  Have you had these symptoms before?: Yes  How long have you been having these symptoms?: Greater than 2 weeks  Please describe any probable cause for these symptoms. : History of polyps  What is the primary reason for your visit?: Other

## 2023-05-25 DIAGNOSIS — I10 PRIMARY HYPERTENSION: ICD-10-CM

## 2023-05-25 RX ORDER — DILTIAZEM HYDROCHLORIDE 240 MG/1
CAPSULE, COATED, EXTENDED RELEASE ORAL
Qty: 90 CAPSULE | Refills: 0 | Status: SHIPPED | OUTPATIENT
Start: 2023-05-25

## 2023-06-05 ENCOUNTER — OFFICE VISIT (OUTPATIENT)
Dept: FAMILY MEDICINE CLINIC | Facility: CLINIC | Age: 73
End: 2023-06-05
Payer: MEDICARE

## 2023-06-05 VITALS
TEMPERATURE: 98.4 F | HEIGHT: 67 IN | DIASTOLIC BLOOD PRESSURE: 62 MMHG | OXYGEN SATURATION: 98 % | WEIGHT: 205 LBS | BODY MASS INDEX: 32.18 KG/M2 | SYSTOLIC BLOOD PRESSURE: 132 MMHG | HEART RATE: 65 BPM

## 2023-06-05 DIAGNOSIS — K21.00 GASTROESOPHAGEAL REFLUX DISEASE WITH ESOPHAGITIS WITHOUT HEMORRHAGE: ICD-10-CM

## 2023-06-05 DIAGNOSIS — E03.9 ACQUIRED HYPOTHYROIDISM: ICD-10-CM

## 2023-06-05 DIAGNOSIS — K76.0 FATTY LIVER: ICD-10-CM

## 2023-06-05 DIAGNOSIS — F33.0 MILD EPISODE OF RECURRENT MAJOR DEPRESSIVE DISORDER: ICD-10-CM

## 2023-06-05 DIAGNOSIS — Z01.89 ROUTINE LAB DRAW: ICD-10-CM

## 2023-06-05 DIAGNOSIS — F41.9 ANXIETY: ICD-10-CM

## 2023-06-05 DIAGNOSIS — Z79.899 MEDICATION MANAGEMENT: Primary | ICD-10-CM

## 2023-06-05 DIAGNOSIS — M54.50 CHRONIC LOW BACK PAIN, UNSPECIFIED BACK PAIN LATERALITY, UNSPECIFIED WHETHER SCIATICA PRESENT: ICD-10-CM

## 2023-06-05 DIAGNOSIS — G89.29 CHRONIC LOW BACK PAIN, UNSPECIFIED BACK PAIN LATERALITY, UNSPECIFIED WHETHER SCIATICA PRESENT: ICD-10-CM

## 2023-06-05 DIAGNOSIS — I10 PRIMARY HYPERTENSION: ICD-10-CM

## 2023-06-05 LAB
AMPHET+METHAMPHET UR QL: NEGATIVE
AMPHETAMINE INTERNAL CONTROL: ABNORMAL
AMPHETAMINES UR QL: NEGATIVE
BARBITURATE INTERNAL CONTROL: ABNORMAL
BARBITURATES UR QL SCN: NEGATIVE
BENZODIAZ UR QL SCN: POSITIVE
BENZODIAZEPINE INTERNAL CONTROL: ABNORMAL
BUPRENORPHINE INTERNAL CONTROL: ABNORMAL
BUPRENORPHINE SERPL-MCNC: NEGATIVE NG/ML
CANNABINOIDS SERPL QL: NEGATIVE
COCAINE INTERNAL CONTROL: ABNORMAL
COCAINE UR QL: NEGATIVE
EXPIRATION DATE: ABNORMAL
Lab: ABNORMAL
MDMA (ECSTASY) INTERNAL CONTROL: ABNORMAL
MDMA UR QL SCN: NEGATIVE
METHADONE INTERNAL CONTROL: ABNORMAL
METHADONE UR QL SCN: NEGATIVE
METHAMPHETAMINE INTERNAL CONTROL: ABNORMAL
OPIATES INTERNAL CONTROL: ABNORMAL
OPIATES UR QL: NEGATIVE
OXYCODONE INTERNAL CONTROL: ABNORMAL
OXYCODONE UR QL SCN: NEGATIVE
PCP UR QL SCN: NEGATIVE
PHENCYCLIDINE INTERNAL CONTROL: ABNORMAL
THC INTERNAL CONTROL: ABNORMAL

## 2023-06-05 RX ORDER — LORAZEPAM 1 MG/1
1-2 TABLET ORAL 2 TIMES DAILY
Qty: 180 TABLET | Refills: 5 | Status: SHIPPED | OUTPATIENT
Start: 2023-06-05

## 2023-06-05 RX ORDER — PANTOPRAZOLE SODIUM 20 MG/1
20 TABLET, DELAYED RELEASE ORAL DAILY
Qty: 90 TABLET | Refills: 3 | Status: SHIPPED | OUTPATIENT
Start: 2023-06-05

## 2023-06-05 RX ORDER — IRBESARTAN 300 MG/1
300 TABLET ORAL DAILY
Qty: 90 TABLET | Refills: 3 | Status: SHIPPED | OUTPATIENT
Start: 2023-06-05

## 2023-06-05 RX ORDER — CYCLOBENZAPRINE HCL 10 MG
10 TABLET ORAL 3 TIMES DAILY PRN
Qty: 60 TABLET | Refills: 5 | Status: SHIPPED | OUTPATIENT
Start: 2023-06-05

## 2023-06-05 RX ORDER — CITALOPRAM 20 MG/1
20 TABLET ORAL DAILY
Qty: 90 TABLET | Refills: 3 | Status: SHIPPED | OUTPATIENT
Start: 2023-06-05

## 2023-06-05 RX ORDER — LEVOTHYROXINE SODIUM 112 UG/1
112 TABLET ORAL DAILY
Qty: 90 TABLET | Refills: 3 | Status: SHIPPED | OUTPATIENT
Start: 2023-06-05

## 2023-06-05 RX ORDER — DILTIAZEM HYDROCHLORIDE 240 MG/1
240 CAPSULE, COATED, EXTENDED RELEASE ORAL DAILY
Qty: 90 CAPSULE | Refills: 3 | Status: SHIPPED | OUTPATIENT
Start: 2023-06-05

## 2023-06-05 RX ORDER — INDAPAMIDE 2.5 MG/1
2.5 TABLET, FILM COATED ORAL DAILY
Qty: 90 TABLET | Refills: 3 | Status: SHIPPED | OUTPATIENT
Start: 2023-06-05

## 2023-06-05 NOTE — PROGRESS NOTES
Chief Complaint  Med Management (Yearly routine medication check and lab work), Anxiety (6 month follow up lorazepam), Hypothyroidism, Hypertension, and Depression    SUBJECTIVE  Shalonda Hart presents to [facility]    Patient is a 72 years old history of gastric sleeve hypertension due to have a colonoscopy by Dr. Randolph this year for thyroidism on Synthroid mild anxiety lorazepam and    PAST MEDICAL HISTORY  Allergies   Allergen Reactions    Hydrochlorothiazide Swelling     ORAL SWELLING/SORES    Sulfa Antibiotics Rash     SWELLING/RASH    Tape Other (See Comments)     Pt gets blisters with plastic tape.    Lidocaine Swelling and Rash     Can not use TOPICAL LIDOCAINE. Does ok with the injectable lidocaine.          Past Surgical History:    APPENDECTOMY    BARIATRIC SURGERY    Gastric Sleeve     SECTION    CHOLECYSTECTOMY    COLONOSCOPY    CYSTOCELE REPAIR    GASTRIC SLEEVE LAPAROSCOPIC    HYSTERECTOMY    RECTOCELE REPAIR    SHOULDER SURGERY    TONSILLECTOMY    UPPER GASTROINTESTINAL ENDOSCOPY    VAGINAL REPAIR    vaginal wall lift       Social History     Tobacco Use    Smoking status: Never    Smokeless tobacco: Never   Substance Use Topics    Alcohol use: Yes     Comment: Last drink was 5 years ago       Family History   Problem Relation Age of Onset    Heart disease Mother     Diabetes Mother     Depression Mother     Hypertension Mother     Hypertension Father     Diabetes Sister     Depression Sister     Hypertension Sister     Heart disease Sister     Kidney disease Sister     Hypertension Brother     Heart disease Brother     Diabetes Brother     Kidney disease Brother     Diabetes Sister     Heart disease Sister     Hypertension Sister     Malig Hyperthermia Neg Hx         Health Maintenance Due   Topic Date Due    COVID-19 Vaccine (1) Never done    Pneumococcal Vaccine 65+ (1 - PCV) Never done    TDAP/TD VACCINES (1 - Tdap) Never done    ANNUAL WELLNESS  "VISIT  Never done    COLORECTAL CANCER SCREENING  12/07/2022   Answers submitted by the patient for this visit:  Other (Submitted on 6/4/2023)  Please describe your symptoms.: Medication follow-up  Have you had these symptoms before?: Yes  How long have you been having these symptoms?: Greater than 2 weeks  Please describe any probable cause for these symptoms. : Follow-up only  Primary Reason for Visit (Submitted on 6/4/2023)  What is the primary reason for your visit?: Other       Last Completed Colonoscopy       This patient has no relevant Health Maintenance data.            REVIEW OF SYSTEMS    Vascular no chest pain no palpitations  Respiratory no shortness of breath or dyspnea exertion  GI patient is due a colonoscopy Dr. Weiss this year is going to lose some more weight sees Dr. Rivas who did the gastric sleeve    OBJECTIVE  Vitals:    06/05/23 1151   BP: 132/62   Pulse: 65   Temp: 98.4 °F (36.9 °C)   SpO2: 98%   Weight: 93 kg (205 lb)   Height: 170.2 cm (67\")     Body mass index is 32.11 kg/m².    PHYSICAL EXAM    General no distress  Cardiovascular regular rhythm no murmur  Respiratory no shortness of breath or dyspnea exertion  Abdomen soft nontender no hepatosplenomegaly  Neurologic mentation normal speech normal gait is normal    ASSESSMENT & PLAN  Diagnoses and all orders for this visit:    1. Medication management (Primary)  -     Lipid Panel; Future  -     Comprehensive Metabolic Panel; Future  -     TSH; Future  -     CBC & Differential; Future  -     POC Urine Drug Screen [facility]    2. Mild episode of recurrent major depressive disorder  -     citalopram (CeleXA) 20 MG tablet; Take 1 tablet by mouth Daily.  Dispense: 90 tablet; Refill: 3  -     POC Urine Drug Screen [facility]Cup    3. Chronic low back pain, unspecified back pain laterality, unspecified whether sciatica present  -     cyclobenzaprine (FLEXERIL) 10 MG tablet; Take 1 tablet by mouth 3 (Three) Times a Day As Needed for " Muscle Spasms.  Dispense: 60 tablet; Refill: 5    4. Primary hypertension  -     dilTIAZem CD (CARDIZEM CD) 240 MG 24 hr capsule; Take 1 capsule by mouth Daily.  Dispense: 90 capsule; Refill: 3  -     indapamide (LOZOL) 2.5 MG tablet; Take 1 tablet by mouth Daily.  Dispense: 90 tablet; Refill: 3  -     irbesartan (AVAPRO) 300 MG tablet; Take 1 tablet by mouth Daily.  Dispense: 90 tablet; Refill: 3  -     Lipid Panel; Future  -     Comprehensive Metabolic Panel; Future    5. Acquired hypothyroidism  -     levothyroxine (SYNTHROID, LEVOTHROID) 112 MCG tablet; Take 1 tablet by mouth Daily.  Dispense: 90 tablet; Refill: 3  -     Comprehensive Metabolic Panel; Future  -     TSH; Future    6. Anxiety  -     LORazepam (ATIVAN) 1 MG tablet; Take 1-2 tablets by mouth 2 (Two) Times a Day.  Dispense: 180 tablet; Refill: 5  -     POC Urine Drug Screen [facility]    7. Gastroesophageal reflux disease with esophagitis without hemorrhage  -     pantoprazole (PROTONIX) 20 MG EC tablet; Take 1 tablet by mouth Daily.  Dispense: 90 tablet; Refill: 3  -     Comprehensive Metabolic Panel; Future  -     TSH; Future  -     CBC & Differential; Future    8. Fatty liver  -     Comprehensive Metabolic Panel; Future    9. Routine lab draw  -     Lipid Panel; Future  -     Comprehensive Metabolic Panel; Future          Hypertension good control fatty liver lose weight gastroesophageal reflux take Protonix hypothyroidism needs TSH generalized anxiety depression continue on lorazepam and Celexa she is due her colonoscopy this year with Dr. Randolph            Patient was given instructions and counseling regarding her condition or for health maintenance advice. Please see specific information pulled into the AVS if appropriate.

## 2023-06-06 ENCOUNTER — CLINICAL SUPPORT (OUTPATIENT)
Dept: FAMILY MEDICINE CLINIC | Facility: CLINIC | Age: 73
End: 2023-06-06
Payer: MEDICARE

## 2023-06-06 DIAGNOSIS — K76.0 FATTY LIVER: ICD-10-CM

## 2023-06-06 DIAGNOSIS — I10 PRIMARY HYPERTENSION: ICD-10-CM

## 2023-06-06 DIAGNOSIS — Z01.89 ROUTINE LAB DRAW: ICD-10-CM

## 2023-06-06 DIAGNOSIS — Z79.899 MEDICATION MANAGEMENT: ICD-10-CM

## 2023-06-06 DIAGNOSIS — E03.9 ACQUIRED HYPOTHYROIDISM: ICD-10-CM

## 2023-06-06 DIAGNOSIS — K21.00 GASTROESOPHAGEAL REFLUX DISEASE WITH ESOPHAGITIS WITHOUT HEMORRHAGE: ICD-10-CM

## 2023-06-06 LAB
ALBUMIN SERPL-MCNC: 4.6 G/DL (ref 3.5–5.2)
ALBUMIN/GLOB SERPL: 2 G/DL
ALP SERPL-CCNC: 64 U/L (ref 39–117)
ALT SERPL W P-5'-P-CCNC: 21 U/L (ref 1–33)
ANION GAP SERPL CALCULATED.3IONS-SCNC: 9 MMOL/L (ref 5–15)
AST SERPL-CCNC: 21 U/L (ref 1–32)
BASOPHILS # BLD AUTO: 0.04 10*3/MM3 (ref 0–0.2)
BASOPHILS NFR BLD AUTO: 0.6 % (ref 0–1.5)
BILIRUB SERPL-MCNC: 0.3 MG/DL (ref 0–1.2)
BUN SERPL-MCNC: 11 MG/DL (ref 8–23)
BUN/CREAT SERPL: 14.7 (ref 7–25)
CALCIUM SPEC-SCNC: 10.3 MG/DL (ref 8.6–10.5)
CHLORIDE SERPL-SCNC: 103 MMOL/L (ref 98–107)
CHOLEST SERPL-MCNC: 189 MG/DL (ref 0–200)
CO2 SERPL-SCNC: 26 MMOL/L (ref 22–29)
CREAT SERPL-MCNC: 0.75 MG/DL (ref 0.57–1)
DEPRECATED RDW RBC AUTO: 40.4 FL (ref 37–54)
EGFRCR SERPLBLD CKD-EPI 2021: 85.2 ML/MIN/1.73
EOSINOPHIL # BLD AUTO: 0.16 10*3/MM3 (ref 0–0.4)
EOSINOPHIL NFR BLD AUTO: 2.6 % (ref 0.3–6.2)
ERYTHROCYTE [DISTWIDTH] IN BLOOD BY AUTOMATED COUNT: 12.1 % (ref 12.3–15.4)
GLOBULIN UR ELPH-MCNC: 2.3 GM/DL
GLUCOSE SERPL-MCNC: 106 MG/DL (ref 65–99)
HCT VFR BLD AUTO: 35 % (ref 34–46.6)
HDLC SERPL-MCNC: 45 MG/DL (ref 40–60)
HGB BLD-MCNC: 12.2 G/DL (ref 12–15.9)
IMM GRANULOCYTES # BLD AUTO: 0.01 10*3/MM3 (ref 0–0.05)
IMM GRANULOCYTES NFR BLD AUTO: 0.2 % (ref 0–0.5)
LDLC SERPL CALC-MCNC: 123 MG/DL (ref 0–100)
LDLC/HDLC SERPL: 2.69 {RATIO}
LYMPHOCYTES # BLD AUTO: 2.63 10*3/MM3 (ref 0.7–3.1)
LYMPHOCYTES NFR BLD AUTO: 42.4 % (ref 19.6–45.3)
MCH RBC QN AUTO: 31.9 PG (ref 26.6–33)
MCHC RBC AUTO-ENTMCNC: 34.9 G/DL (ref 31.5–35.7)
MCV RBC AUTO: 91.4 FL (ref 79–97)
MONOCYTES # BLD AUTO: 0.49 10*3/MM3 (ref 0.1–0.9)
MONOCYTES NFR BLD AUTO: 7.9 % (ref 5–12)
NEUTROPHILS NFR BLD AUTO: 2.87 10*3/MM3 (ref 1.7–7)
NEUTROPHILS NFR BLD AUTO: 46.3 % (ref 42.7–76)
NRBC BLD AUTO-RTO: 0 /100 WBC (ref 0–0.2)
PLATELET # BLD AUTO: 230 10*3/MM3 (ref 140–450)
PMV BLD AUTO: 10.6 FL (ref 6–12)
POTASSIUM SERPL-SCNC: 4.5 MMOL/L (ref 3.5–5.2)
PROT SERPL-MCNC: 6.9 G/DL (ref 6–8.5)
RBC # BLD AUTO: 3.83 10*6/MM3 (ref 3.77–5.28)
SODIUM SERPL-SCNC: 138 MMOL/L (ref 136–145)
TRIGL SERPL-MCNC: 115 MG/DL (ref 0–150)
TSH SERPL DL<=0.05 MIU/L-ACNC: 3.18 UIU/ML (ref 0.27–4.2)
VLDLC SERPL-MCNC: 21 MG/DL (ref 5–40)
WBC NRBC COR # BLD: 6.2 10*3/MM3 (ref 3.4–10.8)

## 2023-06-06 PROCEDURE — 80061 LIPID PANEL: CPT | Performed by: FAMILY MEDICINE

## 2023-06-06 PROCEDURE — 85025 COMPLETE CBC W/AUTO DIFF WBC: CPT | Performed by: FAMILY MEDICINE

## 2023-06-06 PROCEDURE — 80053 COMPREHEN METABOLIC PANEL: CPT | Performed by: FAMILY MEDICINE

## 2023-06-06 PROCEDURE — 84443 ASSAY THYROID STIM HORMONE: CPT | Performed by: FAMILY MEDICINE

## 2023-08-17 ENCOUNTER — TELEPHONE (OUTPATIENT)
Dept: GASTROENTEROLOGY | Facility: CLINIC | Age: 73
End: 2023-08-17
Payer: MEDICARE

## 2023-08-30 ENCOUNTER — ANESTHESIA EVENT (OUTPATIENT)
Dept: GASTROENTEROLOGY | Facility: HOSPITAL | Age: 73
End: 2023-08-30
Payer: MEDICARE

## 2023-08-31 ENCOUNTER — ANESTHESIA (OUTPATIENT)
Dept: GASTROENTEROLOGY | Facility: HOSPITAL | Age: 73
End: 2023-08-31
Payer: MEDICARE

## 2023-08-31 ENCOUNTER — HOSPITAL ENCOUNTER (OUTPATIENT)
Facility: HOSPITAL | Age: 73
Setting detail: HOSPITAL OUTPATIENT SURGERY
Discharge: HOME | End: 2023-08-31
Attending: INTERNAL MEDICINE | Admitting: INTERNAL MEDICINE
Payer: MEDICARE

## 2023-08-31 VITALS
DIASTOLIC BLOOD PRESSURE: 47 MMHG | TEMPERATURE: 97.8 F | RESPIRATION RATE: 14 BRPM | OXYGEN SATURATION: 100 % | SYSTOLIC BLOOD PRESSURE: 113 MMHG | HEART RATE: 64 BPM

## 2023-08-31 DIAGNOSIS — K63.5 POLYP OF COLON, UNSPECIFIED PART OF COLON, UNSPECIFIED TYPE: ICD-10-CM

## 2023-08-31 PROCEDURE — 88305 TISSUE EXAM BY PATHOLOGIST: CPT | Performed by: INTERNAL MEDICINE

## 2023-08-31 PROCEDURE — 25010000002 PROPOFOL 10 MG/ML EMULSION: Performed by: MARRIAGE & FAMILY THERAPIST

## 2023-08-31 RX ORDER — SODIUM CHLORIDE, SODIUM LACTATE, POTASSIUM CHLORIDE, CALCIUM CHLORIDE 600; 310; 30; 20 MG/100ML; MG/100ML; MG/100ML; MG/100ML
30 INJECTION, SOLUTION INTRAVENOUS CONTINUOUS
Status: DISCONTINUED | OUTPATIENT
Start: 2023-08-31 | End: 2023-08-31 | Stop reason: HOSPADM

## 2023-08-31 RX ORDER — SODIUM CHLORIDE, SODIUM LACTATE, POTASSIUM CHLORIDE, CALCIUM CHLORIDE 600; 310; 30; 20 MG/100ML; MG/100ML; MG/100ML; MG/100ML
1000 INJECTION, SOLUTION INTRAVENOUS CONTINUOUS
Status: DISCONTINUED | OUTPATIENT
Start: 2023-08-31 | End: 2023-08-31 | Stop reason: HOSPADM

## 2023-08-31 RX ORDER — PROPOFOL 10 MG/ML
VIAL (ML) INTRAVENOUS AS NEEDED
Status: DISCONTINUED | OUTPATIENT
Start: 2023-08-31 | End: 2023-08-31 | Stop reason: SURG

## 2023-08-31 RX ORDER — LIDOCAINE HYDROCHLORIDE 20 MG/ML
INJECTION, SOLUTION EPIDURAL; INFILTRATION; INTRACAUDAL; PERINEURAL AS NEEDED
Status: DISCONTINUED | OUTPATIENT
Start: 2023-08-31 | End: 2023-08-31 | Stop reason: SURG

## 2023-08-31 RX ADMIN — SODIUM CHLORIDE, POTASSIUM CHLORIDE, SODIUM LACTATE AND CALCIUM CHLORIDE: 600; 310; 30; 20 INJECTION, SOLUTION INTRAVENOUS at 07:29

## 2023-08-31 RX ADMIN — PROPOFOL 50 MG: 10 INJECTION, EMULSION INTRAVENOUS at 07:29

## 2023-08-31 RX ADMIN — LIDOCAINE HYDROCHLORIDE 50 MG: 20 INJECTION, SOLUTION EPIDURAL; INFILTRATION; INTRACAUDAL; PERINEURAL at 07:29

## 2023-08-31 RX ADMIN — PROPOFOL 155 MCG/KG/MIN: 10 INJECTION, EMULSION INTRAVENOUS at 07:29

## 2023-08-31 RX ADMIN — SODIUM CHLORIDE, POTASSIUM CHLORIDE, SODIUM LACTATE AND CALCIUM CHLORIDE 30 ML/HR: 600; 310; 30; 20 INJECTION, SOLUTION INTRAVENOUS at 06:48

## 2023-08-31 NOTE — ANESTHESIA PREPROCEDURE EVALUATION
Anesthesia Evaluation     Patient summary reviewed and Nursing notes reviewed   NPO Solid Status: > 8 hours  NPO Liquid Status: > 4 hours           Airway   Mallampati: II  TM distance: >3 FB  Neck ROM: full  No difficulty expected  Dental      Pulmonary    (+) ,sleep apnea  Cardiovascular     (+) hypertension, DVT      Neuro/Psych  (+) psychiatric history Anxiety and Depression  GI/Hepatic/Renal/Endo    (+) obesity, morbid obesity, GERD, liver disease, thyroid problem hypothyroidism    Musculoskeletal     Abdominal    Substance History      OB/GYN          Other   arthritis,                   Anesthesia Plan    ASA 3     general     intravenous induction       CODE STATUS:

## 2023-08-31 NOTE — H&P
"Pre Procedure History & Physical    Chief Complaint:   Surveillance colonsocopy    Subjective     HPI:   73 yo F here for surveillance colonoscopy.    Past Medical History:   Past Medical History:   Diagnosis Date    Anxiety     Arthritis     Colon polyp     Deep vein thrombosis     After surgery for meniscus.  Right knee    Depression 2015    Diverticulitis     Esophageal dysphagia     Essential hypertension 2015    Fatty liver     Gastroesophageal reflux disease     History of bladder surgery 2021    History of cataract extraction 2021    History of cystocele 2020    repaired 2020    History of DVT (deep vein thrombosis) 2019    History of gastric polyp 2021    History of repair of rectocele 2020    repaired 2020    Hypothyroid 2018    Irritable bowel syndrome (IBS)     Long-term use of high-risk medication 2015    Low back pain 2014    Medication management 2018    Morbid obesity due to excess calories     Sinus congestion     allergies    Skin lesion 2015    Sleep apnea     \"long ago before weight loss\"    Status post gastric bypass for obesity 2015       Past Surgical History:  Past Surgical History:   Procedure Laterality Date    APPENDECTOMY  1969    BARIATRIC SURGERY      Gastric Sleeve     SECTION  /    CHOLECYSTECTOMY      COLONOSCOPY  2017    CYSTOCELE REPAIR  07/15/2020    GASTRIC SLEEVE LAPAROSCOPIC  2019    HYSTERECTOMY  1984    RECTOCELE REPAIR  07/15/2020    SHOULDER SURGERY Left     TONSILLECTOMY  1968    UPPER GASTROINTESTINAL ENDOSCOPY      VAGINAL REPAIR  07/15/2020    vaginal wall lift       Family History:  Family History   Problem Relation Age of Onset    Heart disease Mother     Diabetes Mother     Depression Mother     Hypertension Mother     Hypertension Father     Diabetes Sister     Depression Sister     Hypertension Sister     Heart disease Sister     Kidney disease Sister     " Hypertension Brother     Heart disease Brother     Diabetes Brother     Kidney disease Brother     Diabetes Sister     Heart disease Sister     Hypertension Sister     Malig Hyperthermia Neg Hx        Social History:   reports that she has never smoked. She has never used smokeless tobacco. She reports current alcohol use. She reports current drug use. Drug: Benzodiazepines.    Medications:   Medications Prior to Admission   Medication Sig Dispense Refill Last Dose    Calcium Citrate-Vitamin D 500-500 MG-UNIT chewable tablet Chew.   8/30/2023    carboxymethylcellulose (REFRESH PLUS) 0.5 % solution 3 (Three) Times a Day As Needed.   8/30/2023    Cholecalciferol 50 MCG (2000 UT) tablet Take  by mouth Daily.   8/30/2023    citalopram (CeleXA) 20 MG tablet Take 1 tablet by mouth Daily. 90 tablet 3 8/30/2023    dilTIAZem CD (CARDIZEM CD) 240 MG 24 hr capsule Take 1 capsule by mouth Daily. 90 capsule 3 8/30/2023    docusate calcium (SURFAK) 240 MG capsule Take 1 capsule by mouth 2 (Two) Times a Day.   8/30/2023    irbesartan (AVAPRO) 300 MG tablet Take 1 tablet by mouth Daily. 90 tablet 3 8/30/2023    levothyroxine (SYNTHROID, LEVOTHROID) 112 MCG tablet Take 1 tablet by mouth Daily. 90 tablet 3 8/30/2023    LORazepam (ATIVAN) 1 MG tablet Take 1-2 tablets by mouth 2 (Two) Times a Day. 180 tablet 5 8/30/2023    multivitamin-iron-minerals-folic acid (CENTRUM) chewable tablet Chew 1 tablet Daily.   8/30/2023    multivitamins-minerals (PRESERVISION AREDS 2) capsule capsule Take 1 capsule by mouth 2 (Two) Times a Day.   8/30/2023    pantoprazole (PROTONIX) 20 MG EC tablet Take 1 tablet by mouth Daily. 90 tablet 3 Past Month    Simethicone (GAS-X PO) Take  by mouth Every 4 (Four) Hours As Needed.   Past Month    vitamin B-12 (CYANOCOBALAMIN) 1000 MCG tablet Take 1 tablet by mouth Daily.   8/30/2023    cyclobenzaprine (FLEXERIL) 10 MG tablet Take 1 tablet by mouth 3 (Three) Times a Day As Needed for Muscle Spasms. 60 tablet 5  More than a month    fexofenadine (ALLEGRA) 180 MG tablet Take 1 tablet by mouth Daily As Needed.   More than a month    indapamide (LOZOL) 2.5 MG tablet Take 1 tablet by mouth Daily. 90 tablet 3 More than a month    Psyllium (METAMUCIL FIBER PO) Take  by mouth At Night As Needed.   More than a month    saccharomyces boulardii (FLORASTOR) 250 MG capsule Take 1 capsule by mouth. Only when on antibiotic   More than a month       Allergies:  Hydrochlorothiazide, Sulfa antibiotics, Tape, and Lidocaine    ROS:    Pertinent items are noted in HPI     Objective     Blood pressure 123/64, pulse 65, temperature 97.9 °F (36.6 °C), temperature source Temporal, resp. rate 16, SpO2 97 %.    Physical Exam   Constitutional: Pt is oriented to person, place, and time and well-developed, well-nourished, and in no distress.   Mouth/Throat: Oropharynx is clear and moist.   Neck: Normal range of motion.   Cardiovascular: Normal rate, regular rhythm and normal heart sounds.    Pulmonary/Chest: Effort normal and breath sounds normal.   Abdominal: Soft. Nontender  Skin: Skin is warm and dry.   Psychiatric: Mood, memory, affect and judgment normal.     Assessment & Plan     Diagnosis:  Surveillance colonoscopy    Anticipated Surgical Procedure:  Colonoscopy    The risks, benefits, and alternatives of this procedure have been discussed with the patient or the responsible party- the patient understands and agrees to proceed.

## 2023-08-31 NOTE — ANESTHESIA POSTPROCEDURE EVALUATION
Patient: Shalonda Hart    Procedure Summary       Date: 08/31/23 Room / Location: [facility] ENDOSCOPY 4 / [facility] ENDOSCOPY    Anesthesia Start: 0724 Anesthesia Stop: 0758    Procedure: COLONOSCOPY FOR SCREENING with polypectomy with cold snare Diagnosis:       Polyp of colon, unspecified part of colon, unspecified type      (Polyp of colon, unspecified part of colon, unspecified type [K63.5])    Surgeons: Destini Randolph MD Provider: Pablo Meredith CRNA    Anesthesia Type: general ASA Status: 3            Anesthesia Type: general    Vitals  Vitals Value Taken Time   /47 08/31/23 0811   Temp 36.6 øC (97.8 øF) 08/31/23 0755   Pulse 64 08/31/23 0815   Resp 14 08/31/23 0810   SpO2 100 % 08/31/23 0814   Vitals shown include unvalidated device data.        Post Anesthesia Care and Evaluation    Post-procedure mental status: acceptable.  Pain management: satisfactory to patient    Airway patency: patent  Anesthetic complications: No anesthetic complications    Cardiovascular status: acceptable  Respiratory status: acceptable    Comments: Per chart review

## 2023-09-01 LAB
CYTO UR: NORMAL
LAB AP CASE REPORT: NORMAL
LAB AP CLINICAL INFORMATION: NORMAL
PATH REPORT.FINAL DX SPEC: NORMAL
PATH REPORT.GROSS SPEC: NORMAL

## 2023-09-05 ENCOUNTER — TELEPHONE (OUTPATIENT)
Dept: GASTROENTEROLOGY | Facility: CLINIC | Age: 73
End: 2023-09-05
Payer: MEDICARE

## 2023-09-05 NOTE — TELEPHONE ENCOUNTER
----- Message from MARGARITA Sr sent at 9/1/2023  1:36 PM EDT -----  Biopsies are benign.  Please place in recall for repeat colonoscopy in 1 year due to quality of the bowel prep.  Send letter to patient and PCP.

## 2023-10-19 ENCOUNTER — HOSPITAL ENCOUNTER (OUTPATIENT)
Dept: GENERAL RADIOLOGY | Facility: HOSPITAL | Age: 73
Discharge: HOME | End: 2023-10-19
Admitting: FAMILY MEDICINE
Payer: MEDICARE

## 2023-10-19 ENCOUNTER — OFFICE VISIT (OUTPATIENT)
Dept: FAMILY MEDICINE CLINIC | Facility: CLINIC | Age: 73
End: 2023-10-19
Payer: MEDICARE

## 2023-10-19 VITALS
HEART RATE: 84 BPM | BODY MASS INDEX: 31.71 KG/M2 | SYSTOLIC BLOOD PRESSURE: 130 MMHG | HEIGHT: 67 IN | TEMPERATURE: 97 F | OXYGEN SATURATION: 94 % | WEIGHT: 202 LBS | DIASTOLIC BLOOD PRESSURE: 76 MMHG

## 2023-10-19 DIAGNOSIS — M54.42 ACUTE LEFT-SIDED LOW BACK PAIN WITH LEFT-SIDED SCIATICA: ICD-10-CM

## 2023-10-19 DIAGNOSIS — M54.42 ACUTE LEFT-SIDED LOW BACK PAIN WITH LEFT-SIDED SCIATICA: Primary | ICD-10-CM

## 2023-10-19 PROCEDURE — 3075F SYST BP GE 130 - 139MM HG: CPT | Performed by: FAMILY MEDICINE

## 2023-10-19 PROCEDURE — 72110 X-RAY EXAM L-2 SPINE 4/>VWS: CPT

## 2023-10-19 PROCEDURE — 99213 OFFICE O/P EST LOW 20 MIN: CPT | Performed by: FAMILY MEDICINE

## 2023-10-19 PROCEDURE — 3078F DIAST BP <80 MM HG: CPT | Performed by: FAMILY MEDICINE

## 2023-10-19 RX ORDER — METHYLPREDNISOLONE 4 MG/1
TABLET ORAL
Qty: 21 TABLET | Refills: 0 | Status: SHIPPED | OUTPATIENT
Start: 2023-10-19

## 2023-10-19 NOTE — ASSESSMENT & PLAN NOTE
We will get an x-ray of her lumbar spine.  We will give her a short burst of some steroids and continue the muscle relaxers.  We will also get her set up for some physical therapy.

## 2023-10-19 NOTE — PROGRESS NOTES
Chief Complaint   Patient presents with    Hip Pain     Left side         Subjective     Shalonda Hart  has a past medical history of Anxiety, Arthritis, Colon polyp, Deep vein thrombosis, Depression (09/03/2015), Diverticulitis, Esophageal dysphagia, Essential hypertension (09/03/2015), Fatty liver, Gastroesophageal reflux disease, History of bladder surgery (03/29/2021), History of cataract extraction (03/29/2021), History of cystocele (08/25/2020), History of DVT (deep vein thrombosis) (01/08/2019), History of gastric polyp (03/29/2021), History of repair of rectocele (08/25/2020), Hypothyroid (07/05/2018), Irritable bowel syndrome (IBS), Long-term use of high-risk medication (09/03/2015), Low back pain (06/02/2014), Medication management (07/05/2018), Morbid obesity due to excess calories, Sinus congestion, Skin lesion (09/03/2015), Sleep apnea, and Status post gastric bypass for obesity (03/21/2015).    Hip pain-she has had left hip pain now for about 6 weeks.  She states the pain is in the left hip buttocks left lower back and radiates down the left leg.  She has been taking Tylenol on rather routine basis without any benefit.  She does notice some numbness in the lower leg.  She does not notice any specific weakness though she does notice it is difficult going up stairs.  It does feel better when she lays in bed but then has some cramps in her leg.        PHQ-2 Depression Screening  Little interest or pleasure in doing things?     Feeling down, depressed, or hopeless?     PHQ-2 Total Score     PHQ-9 Depression Screening  Little interest or pleasure in doing things?     Feeling down, depressed, or hopeless?     Trouble falling or staying asleep, or sleeping too much?     Feeling tired or having little energy?     Poor appetite or overeating?     Feeling bad about yourself - or that you are a failure or have let yourself or your family down?     Trouble concentrating on things, such as reading the newspaper  or watching television?     Moving or speaking so slowly that other people could have noticed? Or the opposite - being so fidgety or restless that you have been moving around a lot more than usual?     Thoughts that you would be better off dead, or of hurting yourself in some way?     PHQ-9 Total Score     If you checked off any problems, how difficult have these problems made it for you to do your work, take care of things at home, or get along with other people?       Allergies   Allergen Reactions    Hydrochlorothiazide Swelling     ORAL SWELLING/SORES    Sulfa Antibiotics Rash     SWELLING/RASH    Tape Other (See Comments)     Pt gets blisters with plastic tape.    Lidocaine Swelling and Rash     Can not use TOPICAL LIDOCAINE. Does ok with the injectable lidocaine.         Prior to Admission medications    Medication Sig Start Date End Date Taking? Authorizing Provider   Calcium Citrate-Vitamin D 500-500 MG-UNIT chewable tablet Chew.   Yes Meeta Ayala MD   carboxymethylcellulose (REFRESH PLUS) 0.5 % solution 3 (Three) Times a Day As Needed.   Yes Meeta Ayala MD   Cholecalciferol 50 MCG (2000 UT) tablet Take  by mouth Daily.   Yes Meeta Ayala MD   citalopram (CeleXA) 20 MG tablet Take 1 tablet by mouth Daily. 6/5/23  Yes Alo Viramontes III, MD   cyclobenzaprine (FLEXERIL) 10 MG tablet Take 1 tablet by mouth 3 (Three) Times a Day As Needed for Muscle Spasms. 6/5/23  Yes Alo Viramontes III, MD   dilTIAZem CD (CARDIZEM CD) 240 MG 24 hr capsule Take 1 capsule by mouth Daily. 6/5/23  Yes Alo Viramontes III, MD   docusate calcium (SURFAK) 240 MG capsule Take 1 capsule by mouth 2 (Two) Times a Day.   Yes Meeta Ayala MD   fexofenadine (ALLEGRA) 180 MG tablet Take 1 tablet by mouth Daily As Needed.   Yes Meeta Ayala MD   indapamide (LOZOL) 2.5 MG tablet Take 1 tablet by mouth Daily. 6/5/23  Yes Alo Viramontes III, MD   irbesartan (AVAPRO) 300 MG tablet Take 1 tablet by  mouth Daily. 23  Yes Alo Viramontes III, MD   levothyroxine (SYNTHROID, LEVOTHROID) 112 MCG tablet Take 1 tablet by mouth Daily. 23  Yes Alo Viramontes III, MD   LORazepam (ATIVAN) 1 MG tablet Take 1-2 tablets by mouth 2 (Two) Times a Day. 23  Yes Alo Viramontes III, MD   multivitamin-iron-minerals-folic acid (CENTRUM) chewable tablet Chew 1 tablet Daily.   Yes Meeta Ayala MD   multivitamins-minerals (PRESERVISION AREDS 2) capsule capsule Take 1 capsule by mouth 2 (Two) Times a Day.   Yes Meeta Ayala MD   pantoprazole (PROTONIX) 20 MG EC tablet Take 1 tablet by mouth Daily. 23  Yes Alo Viramontes III, MD   Psyllium (METAMUCIL FIBER PO) Take  by mouth At Night As Needed.   Yes Meeta Ayala MD   saccharomyces boulardii (FLORASTOR) 250 MG capsule Take 1 capsule by mouth. Only when on antibiotic   Yes Emergency, Nurse Epic, RN   Simethicone (GAS-X PO) Take  by mouth Every 4 (Four) Hours As Needed.   Yes Meeta Ayala MD   vitamin B-12 (CYANOCOBALAMIN) 1000 MCG tablet Take 1 tablet by mouth Daily.   Yes Meeta Ayala MD   HYDROcodone-acetaminophen (NORCO) 5-325 MG per tablet Take 1 tablet by mouth.  21  Meeta Ayala MD        Patient Active Problem List   Diagnosis    Medication management    Low back pain    Hypothyroid    Hypertension    Gastroesophageal reflux disease    Fatty liver    Esophageal dysphagia    Depression    Anxiety    Arthritis    Hx of bariatric surgery    History of suburethral sling procedure    Midline cystocele    Morbid (severe) obesity due to excess calories    Vaginal vault prolapse    Rectocele    Polyp of colon    Acute left-sided low back pain with left-sided sciatica        Past Surgical History:   Procedure Laterality Date    APPENDECTOMY      BARIATRIC SURGERY      Gastric Sleeve     SECTION      CHOLECYSTECTOMY      COLONOSCOPY      COLONOSCOPY N/A 2023    Procedure: COLONOSCOPY  "FOR SCREENING with polypectomy with cold snare;  Surgeon: Destini Randolph MD;  Location: [facility] ENDOSCOPY;  Service: Gastroenterology;  Laterality: N/A;  colon polyp, diverticulosis    CYSTOCELE REPAIR  07/15/2020    GASTRIC SLEEVE LAPAROSCOPIC  2019    HYSTERECTOMY  1984    RECTOCELE REPAIR  07/15/2020    SHOULDER SURGERY Left 2001    TONSILLECTOMY  1968    UPPER GASTROINTESTINAL ENDOSCOPY      VAGINAL REPAIR  07/15/2020    vaginal wall lift       Social History     Socioeconomic History    Marital status:    Tobacco Use    Smoking status: Never    Smokeless tobacco: Never   Vaping Use    Vaping Use: Never used   Substance and Sexual Activity    Alcohol use: Yes     Comment: rarely    Drug use: Yes     Types: Benzodiazepines     Comment: lorazepam    Sexual activity: Yes     Partners: Male     Birth control/protection: Hysterectomy     Comment: Spouse only       Family History   Problem Relation Age of Onset    Heart disease Mother     Diabetes Mother     Depression Mother     Hypertension Mother     Hypertension Father     Diabetes Sister     Depression Sister     Hypertension Sister     Heart disease Sister     Kidney disease Sister     Hypertension Brother     Heart disease Brother     Diabetes Brother     Kidney disease Brother     Diabetes Sister     Heart disease Sister     Hypertension Sister     Malig Hyperthermia Neg Hx        Family history, surgical history, past medical history, Allergies and meds reviewed with patient today and updated in [employer] EMR.     ROS:  Review of Systems   Musculoskeletal:  Positive for arthralgias and back pain.   Neurological:  Positive for numbness. Negative for weakness.       OBJECTIVE:  Vitals:    10/19/23 0822   BP: 130/76   BP Location: Left arm   Patient Position: Sitting   Pulse: 84   Temp: 97 °F (36.1 °C)   SpO2: 94%   Weight: 91.6 kg (202 lb)   Height: 170.2 cm (67\")     No results found.   Body mass index is 31.64 kg/m².  No LMP recorded. Patient has " had a hysterectomy.    Physical Exam  Vitals and nursing note reviewed.   Constitutional:       General: She is not in acute distress.     Appearance: Normal appearance. She is normal weight.   HENT:      Head: Normocephalic.   Musculoskeletal:      Lumbar back: Tenderness present. No swelling, deformity or spasms. Positive left straight leg raise test. Negative right straight leg raise test.        Back:    Neurological:      Mental Status: She is alert.      Sensory: Sensation is intact.      Motor: Weakness (Left lower leg) present.      Deep Tendon Reflexes:      Reflex Scores:       Patellar reflexes are 2+ on the right side and 1+ on the left side.       Achilles reflexes are 2+ on the right side and 1+ on the left side.        Procedures    No visits with results within 30 Day(s) from this visit.   Latest known visit with results is:   Admission on 08/31/2023, Discharged on 08/31/2023   Component Date Value Ref Range Status    Case Report 08/31/2023    Final                    Value:Surgical Pathology Report                         Case: [id number]                                  Authorizing Provider:  Destini Randolph MD Collected:           08/31/2023 07:42 AM          Ordering Location:     [facility] Received:            08/31/2023 09:57 AM                                 SUITES                                                                       Pathologist:           Latricia Higuera DO                                                       Specimen:    Large Intestine, Right / Ascending Colon, ascending colon polyp                            Clinical Information 08/31/2023    Final                    Value:This result contains rich text formatting which cannot be displayed here.    Final Diagnosis 08/31/2023    Final                    Value:This result contains rich text formatting which cannot be displayed here.    Gross Description 08/31/2023    Final                     Value:This result contains rich text formatting which cannot be displayed here.    Microscopic Description 08/31/2023    Final                    Value:This result contains rich text formatting which cannot be displayed here.       ASSESSMENT/ PLAN:    Diagnoses and all orders for this visit:    1. Acute left-sided low back pain with left-sided sciatica (Primary)  Assessment & Plan:  We will get an x-ray of her lumbar spine.  We will give her a short burst of some steroids and continue the muscle relaxers.  We will also get her set up for some physical therapy.    Orders:  -     XR Spine Lumbar Complete 4+VW; Future  -     Ambulatory Referral to Physical Therapy Evaluate and treat    Other orders  -     methylPREDNISolone (MEDROL) 4 MG dose pack; Take as directed on package instructions.  Dispense: 21 tablet; Refill: 0        BMI is >= 30 and <35. (Class 1 Obesity). The following options were offered after discussion;: weight loss educational material (shared in after visit summary), exercise counseling/recommendations, and nutrition counseling/recommendations      Orders Placed Today:     New Medications Ordered This Visit   Medications    methylPREDNISolone (MEDROL) 4 MG dose pack     Sig: Take as directed on package instructions.     Dispense:  21 tablet     Refill:  0        Management Plan:     An After Visit Summary was printed and given to the patient at discharge.    Follow-up: Return in about 4 weeks (around 11/16/2023) for Recheck.    Juan Antonio Gagnon DO 10/19/2023 08:43 EDT  This note was electronically signed.

## 2023-10-20 DIAGNOSIS — M54.42 ACUTE LEFT-SIDED LOW BACK PAIN WITH LEFT-SIDED SCIATICA: Primary | ICD-10-CM

## 2023-10-26 ENCOUNTER — HOSPITAL ENCOUNTER (OUTPATIENT)
Dept: MRI IMAGING | Facility: HOSPITAL | Age: 73
Discharge: HOME | End: 2023-10-26
Admitting: FAMILY MEDICINE
Payer: MEDICARE

## 2023-10-26 DIAGNOSIS — M54.42 ACUTE LEFT-SIDED LOW BACK PAIN WITH LEFT-SIDED SCIATICA: ICD-10-CM

## 2023-10-26 PROCEDURE — 72148 MRI LUMBAR SPINE W/O DYE: CPT

## 2023-10-31 ENCOUNTER — TREATMENT (OUTPATIENT)
Dept: PHYSICAL THERAPY | Facility: CLINIC | Age: 73
End: 2023-10-31
Payer: MEDICARE

## 2023-10-31 DIAGNOSIS — M54.42 LEFT-SIDED LOW BACK PAIN WITH LEFT-SIDED SCIATICA, UNSPECIFIED CHRONICITY: Primary | ICD-10-CM

## 2023-10-31 DIAGNOSIS — M25.60 JOINT STIFFNESS OF SPINE: ICD-10-CM

## 2023-10-31 PROCEDURE — 97161 PT EVAL LOW COMPLEX 20 MIN: CPT | Performed by: PHYSICAL THERAPIST

## 2023-10-31 PROCEDURE — 97110 THERAPEUTIC EXERCISES: CPT | Performed by: PHYSICAL THERAPIST

## 2023-10-31 NOTE — PROGRESS NOTES
Physical Therapy Initial Evaluation and Plan of Care                    [facility] [address] [place] [address]    Patient: Shalonda Hart   : 1950  Diagnosis/ICD-10 Code:  Left-sided low back pain with left-sided sciatica, unspecified chronicity [M54.42]  Referring practitioner: Juan Antonio Gagnon,*  Date of Initial Visit: 10/31/2023  Today's Date: 10/31/2023  Patient seen for 1 sessions           Subjective Questionnaire: Oswestry:  = 42% Disability      Subjective Evaluation    History of Present Illness  Mechanism of injury: The patient presents to physical therapy with complaints of low back pain that has been present intermittently for years, but really worsened about 6-8 weeks ago. She sought medical care and went through a round of prednisone. It helped some, but the effects are wearing off. She is now taking flexeril and tylenol for pain management. Her pain is located in the left side of her low back and radiates down her left leg to foot level. Her pain is increased with standing, walking, and prolonged sitting.       Patient Occupation: Retired - Nursing Pain  Current pain ratin  At best pain rating: 3  At worst pain rating: 10    Diagnostic Tests  MRI studies: abnormal (multilevel disc bulges, facet arthritis, and foraminal narrowing)    Patient Goals  Patient goals for therapy: independence with ADLs/IADLs, increased strength, decreased pain and increased motion  Patient goal: Be able to care for her great grandchildren.         Objective          Tenderness     Additional Tenderness Details  Tenderness in the left lumbar paraspinals, left piriformis, and left SI joint.    Neurological Testing     Additional Neurological Details  Sensation to light touch intact and equal bilaterally from L2-S1 dermatomal pattern.    Seated slump: (+) on left for increased sciatic neural tension, (-) on right    Supine passive SLR: (+) on the left at 30 degrees, (-) on  right    Active Range of Motion     Lumbar   Flexion: 50 degrees with pain  Extension: 15 degrees with pain  Left lateral flexion: Active left lumbar lateral flexion: 2'' above knee joint line. with pain  Right lateral flexion: Active right lumbar lateral flexion: knee joint line.   Left rotation: Active left lumbar rotation: 50% with pain  Right rotation: Active right lumbar rotation: 75%     Strength/Myotome Testing     Left Hip   Planes of Motion   Flexion: 4-  Extension: 3  Abduction: 3+    Right Hip   Planes of Motion   Flexion: 4  Extension: 4-  Abduction: 4    Left Knee   Flexion: 4  Extension: 4+    Right Knee   Flexion: 4+  Extension: 4+    Left Ankle/Foot   Dorsiflexion: 4    Right Ankle/Foot   Dorsiflexion: 4+    Muscle Activation     Additional Muscle Activation Details  Decreased left sided glut activation noted with prone hip extension.    Ambulation     Ambulation: Level Surfaces     Additional Level Surfaces Ambulation Details  The patient ambulates with normal biomechanics of gait.      See Exercise, Manual, and Modality Logs for complete treatment.     Assessment & Plan       Assessment  Impairments: abnormal gait, abnormal muscle firing, abnormal muscle tone, abnormal or restricted ROM, activity intolerance, impaired balance, impaired physical strength, lacks appropriate home exercise program, pain with function and safety issue   Functional limitations: carrying objects, lifting, sleeping, walking, pulling, pushing, uncomfortable because of pain, moving in bed, sitting, standing, stooping and unable to perform repetitive tasks   Assessment details: The patient presents to physical therapy with complaints of left sided low back pain with left sided radicular symptoms. She presents with associated lumbar stiffness, hip weakness, tenderness to palpation, increased sciatic neural tension, and functional deficits (LARRY). She would benefit from skilled physical therapy as described below to address  these limitations and allow the patient to return to her prior level of function.   Prognosis: good    Goals  Plan Goals: LOW BACK PROBLEMS:    1. The patient complains of low back pain.  LTG 1: 12 weeks:  The patient will report a pain rating of 1/10 or better in order to improve  tolerance to activities of daily living and improve sleep quality.  STATUS:  New  STG 1a: 6 weeks:  The patient will report a pain rating of 3/10 or better.  STATUS:  New    2. The patient demonstrates weakness of the left hip.  LTG 2: 12 weeks:  The patient will demonstrate 4+ /5 strength for left hip flexion, abduction,  and extension in order to improve hip stability.  STATUS:  New  STG 2a: 6 weeks:  The patient will demonstrate 4 /5 strength for left hip flexion, abduction,  and extension.  STATUS:  New    3. The patient has increased left sided sciatic neural tension with supine passive SLR test.  LTG 3: 12 weeks:  The patient will demonstrate a reduction in sciatic neural tension by completing SLR test to 60 degrees of hip flexion or greater.  STATUS:  New  STG 3a: 6 weeks: The patient will demonstrate a reduction in sciatic neural tension by completing SLR test to 40 degrees of hip flexion or greater.    STATUS:  New    4. The patient has limited lumbar AROM  LTG 4: 12 weeks:  The patient will demonstrate lumbar AROM as follows: 65 of flexion and 20 degrees of extension.  STATUS:  New  STG 4a: 6 weeks: The patient will demonstrate lumbar AROM as follows: 60 of flexion and 15 degrees of extension.  STATUS:  New    5. Mobility: Walking/Moving Around Functional Limitation    LTG 5: 12 weeks:  The patient will demonstrate 10 % limitation by achieving a score of 5/50 on the LARRY.  STATUS:  New  STG 5 a: 6 weeks:  The patient will demonstrate 30 % limitation by achieving a score of 15/50 on the LARRY.    STATUS:  New     Plan  Therapy options: will be seen for skilled therapy services  Planned modality interventions: cryotherapy, electrical  stimulation/Iranian stimulation, TENS, dry needling and traction  Planned therapy interventions: balance/weight-bearing training, ADL retraining, soft tissue mobilization, strengthening, stretching, therapeutic activities, joint mobilization, home exercise program, functional ROM exercises, flexibility, body mechanics training, postural training, neuromuscular re-education, manual therapy, abdominal trunk stabilization, IADL retraining and spinal/joint mobilization  Frequency: 2x week  Duration in weeks: 12  Treatment plan discussed with: patient        Visit Diagnoses:    ICD-10-CM ICD-9-CM   1. Left-sided low back pain with left-sided sciatica, unspecified chronicity  M54.42 724.3   2. Joint stiffness of spine  M25.60 719.58       History # of Personal Factors and/or Comorbidities: LOW (0)  Examination of Body System(s): # of elements: LOW (1-2)  Clinical Presentation: STABLE   Clinical Decision Making: LOW       Timed:         Manual Therapy:    0     mins  76703;     Therapeutic Exercise:    10     mins  79109;     Neuromuscular Dilcia:    0    mins  23421;    Therapeutic Activity:     0     mins  26059;     Gait Trainin     mins  06735;     Ultrasound:     0     mins  78298;    Ionto                               0    mins   75277  Self Care                       0     mins   01643  Canalith Repos    0     mins 20782      Un-Timed:  Electrical Stimulation:    0     mins  99952 ( );  Dry Needling     0     mins self-pay  Traction     0     mins 82590  Low Eval     30     Mins  50335  Mod Eval     0     Mins  03838  High Eval                       0     Mins  71292  Re-Eval                           0    mins  83318    Timed Treatment:   10   mins   Total Treatment:     40   mins    PT SIGNATURE: Elvis Aldana PT    Electronically signed 10/31/2023    [place] License: PT - [id number]      Initial Certification  Certification Period: 10/31/2023 thru 2024  I certify that the therapy services are furnished  while this patient is under my care.  The services outlined above are required by this patient, and will be reviewed every 90 days.     PHYSICIAN: Juan Antonio Gagnon DO  NPI: [id number]      DATE:     Please sign and return via fax to [phone]. Thank you, [facility] Physical Therapy.

## 2023-11-02 ENCOUNTER — TREATMENT (OUTPATIENT)
Dept: PHYSICAL THERAPY | Facility: CLINIC | Age: 73
End: 2023-11-02
Payer: MEDICARE

## 2023-11-02 DIAGNOSIS — M54.42 LEFT-SIDED LOW BACK PAIN WITH LEFT-SIDED SCIATICA, UNSPECIFIED CHRONICITY: Primary | ICD-10-CM

## 2023-11-02 DIAGNOSIS — M25.60 JOINT STIFFNESS OF SPINE: ICD-10-CM

## 2023-11-02 NOTE — PROGRESS NOTES
Physical Therapy Daily Treatment Note      Patient: Shalonda Hart   : 1950  Referring practitioner: Juan Antonio Gagnon,*  Date of Initial Visit: Type: THERAPY  Noted: 10/31/2023  Today's Date: 2023  Patient seen for 2 sessions           Subjective Questionnaire:       Subjective Evaluation    History of Present Illness    Subjective comment: Pt reported waling into clinic pain is 3/10.       Objective   See Exercise, Manual, and Modality Logs for complete treatment.       Assessment & Plan       Assessment  Assessment details: Pt reports 5/10 SI pain with L hip ABD and 2lb wt and pain decreased to 2-3/10 without wt.  Pt's R hip pain 2/10 with R hip.  Pt reports increased pain with sitting and has difficulty and pain with sit to stand transfer and walking.  Pt tolerated session well.  Continue with POC.      Visit Diagnoses:    ICD-10-CM ICD-9-CM   1. Left-sided low back pain with left-sided sciatica, unspecified chronicity  M54.42 724.3   2. Joint stiffness of spine  M25.60 719.58       Progress per Plan of Care and Progress strengthening /stabilization /functional activity           Timed:  Manual Therapy:         mins  51668;  Therapeutic Exercise:    20     mins  16113;     Neuromuscular Dilcia:        mins  68590;    Therapeutic Activity:     8     mins  75413;     Gait Training:           mins  32872;     Ultrasound:          mins  60940;    Electrical Stimulation:         mins  59866 ( );  Aquatic Therapy          mins  73063    Untimed:  Electrical Stimulation:         mins  88292 ( );  Mechanical Traction:         mins  94760;     Timed Treatment:   28   mins   Total Treatment:     28   mins    Electronically signed    Zaynab Dejesus PTA  Physical Therapist Assistant    [employer] license: [id number]

## 2023-11-07 ENCOUNTER — TREATMENT (OUTPATIENT)
Dept: PHYSICAL THERAPY | Facility: CLINIC | Age: 73
End: 2023-11-07
Payer: MEDICARE

## 2023-11-07 DIAGNOSIS — M54.42 LEFT-SIDED LOW BACK PAIN WITH LEFT-SIDED SCIATICA, UNSPECIFIED CHRONICITY: Primary | ICD-10-CM

## 2023-11-07 DIAGNOSIS — M25.60 JOINT STIFFNESS OF SPINE: ICD-10-CM

## 2023-11-07 PROCEDURE — 97110 THERAPEUTIC EXERCISES: CPT | Performed by: PHYSICAL THERAPIST

## 2023-11-07 PROCEDURE — 97530 THERAPEUTIC ACTIVITIES: CPT | Performed by: PHYSICAL THERAPIST

## 2023-11-07 NOTE — PROGRESS NOTES
Outpatient Physical Therapy                   Physical Therapy Daily Treatment Note    Patient: Shalonda Hart   : 1950  Diagnosis/ICD-10 Code:  Left-sided low back pain with left-sided sciatica, unspecified chronicity [M54.42]  Referring practitioner: Juan Antonio Gagnon,*  Date of Initial Visit: Type: THERAPY  Noted: 10/31/2023  Today's Date: 2023  Patient seen for 3 sessions             Subjective   Shalonda Hart reports: increased pain after last session, patient states her pain got up to 7-8/10 pain at its worst but it was decreased to 2-3/10 pain with tylenol.     Pain: 2/10 pain, at time of arrival. Patient states her pain was increased to 3/10 pain in her low back (achy pain) at the end of the session with no pain in her hips.     Objective     See Exercise, Manual, and Modality Logs for complete treatment.     Assessment/Plan  Shalonda still experiencing increased hip and back  pain, especially after last therapy session. Pt tolerated exercises well, with minor increased discomfort at the end of the session. Pt would benefit from skilled PT to address Range of Motion  and Strength deficits, pain management and any concerns with ADLs.     Progress per Plan of Care      Timed:  Manual Therapy:    0     mins  09454;  Therapeutic Exercise:    16     mins  04904;     Neuromuscular Dilcia:    0    mins  89376;    Therapeutic Activity:     8     mins  14847;     Gait Trainin     mins  95436;    Aquatic Therapy:     0     mins  94193;       Untimed:  Electrical Stimulation:    0     mins  45868 ( );  Mechanical Traction:    0     mins  03929;       Timed Treatment:   24   mins   Total Treatment:     24   mins      Electronically signed:   Joseline Lopez PTA  Physical Therapist Assistant  [facility] License #: [id number]

## 2023-11-09 ENCOUNTER — TREATMENT (OUTPATIENT)
Dept: PHYSICAL THERAPY | Facility: CLINIC | Age: 73
End: 2023-11-09
Payer: MEDICARE

## 2023-11-09 DIAGNOSIS — M54.42 LEFT-SIDED LOW BACK PAIN WITH LEFT-SIDED SCIATICA, UNSPECIFIED CHRONICITY: Primary | ICD-10-CM

## 2023-11-09 DIAGNOSIS — M25.60 JOINT STIFFNESS OF SPINE: ICD-10-CM

## 2023-11-09 NOTE — PROGRESS NOTES
Physical Therapy Daily Treatment Note      Patient: Shalonda Hart   : 1950  Referring practitioner: Juan Antonio Gagnon,*  Date of Initial Visit: Type: THERAPY  Noted: 10/31/2023  Today's Date: 2023  Patient seen for 4 sessions           Subjective Questionnaire:       Subjective Evaluation    History of Present Illness    Subjective comment: Pt reports that between PT and HEP she feels her back doesn't hurt as much and burns intermittently.  Pt reports sciatica still goes down to toes on her on L.  Pt reports that after sit to stand transfer the first few feet of gait are difficult.       Objective   See Exercise, Manual, and Modality Logs for complete treatment.       Assessment & Plan       Assessment  Assessment details: Pt reported weakness in hips with PPT with marching.  Pt tolerated session well.  Pt reports Llower side from hip all the way down felt much better after session today.  Continue with POC.        Visit Diagnoses:    ICD-10-CM ICD-9-CM   1. Left-sided low back pain with left-sided sciatica, unspecified chronicity  M54.42 724.3   2. Joint stiffness of spine  M25.60 719.58       Progress per Plan of Care and Progress strengthening /stabilization /functional activity           Timed:  Manual Therapy:    9     mins  77148;  Therapeutic Exercise:    12     mins  23863;     Neuromuscular Dilcia:        mins  18591;    Therapeutic Activity:     10     mins  53095;     Gait Training:           mins  42800;     Ultrasound:          mins  10523;    Electrical Stimulation:         mins  88213 ( );  Aquatic Therapy          mins  69970    Untimed:  Electrical Stimulation:         mins  02333 ( );  Mechanical Traction:         mins  80008;     Timed Treatment:   31   mins   Total Treatment:     31   mins    Electronically signed    Zaynab Dejesus PTA  Physical Therapist Assistant    CIND[facility] license: [id number]

## 2023-11-14 ENCOUNTER — TREATMENT (OUTPATIENT)
Dept: PHYSICAL THERAPY | Facility: CLINIC | Age: 73
End: 2023-11-14
Payer: MEDICARE

## 2023-11-14 DIAGNOSIS — M25.60 JOINT STIFFNESS OF SPINE: ICD-10-CM

## 2023-11-14 DIAGNOSIS — M54.42 LEFT-SIDED LOW BACK PAIN WITH LEFT-SIDED SCIATICA, UNSPECIFIED CHRONICITY: Primary | ICD-10-CM

## 2023-11-16 ENCOUNTER — TREATMENT (OUTPATIENT)
Dept: PHYSICAL THERAPY | Facility: CLINIC | Age: 73
End: 2023-11-16
Payer: MEDICARE

## 2023-11-16 DIAGNOSIS — M25.60 JOINT STIFFNESS OF SPINE: ICD-10-CM

## 2023-11-16 DIAGNOSIS — M54.42 LEFT-SIDED LOW BACK PAIN WITH LEFT-SIDED SCIATICA, UNSPECIFIED CHRONICITY: Primary | ICD-10-CM

## 2023-11-16 NOTE — PROGRESS NOTES
Physical Therapy Daily Treatment Note      Patient: Shalonda Hart   : 1950  Referring practitioner: Juan Antonio Gagnon,*  Date of Initial Visit: Type: THERAPY  Noted: 10/31/2023  Today's Date: 2023  Patient seen for 6 sessions           Subjective Questionnaire:       Subjective Evaluation    History of Present Illness    Subjective comment: Pt reported no pain coming into clinic and started to increased to 2-3/10 with strengthening exercise.  Pt reports after last session she went shopping for about one hour then went to have lunch  and reports her back was really hurting 8/10 and couldn't do anything else that day.       Objective   See Exercise, Manual, and Modality Logs for complete treatment.       Assessment & Plan       Assessment  Assessment details: Pt tolerated strengthening well.  Pt reported prolonged long axis traction felt good and had no pain walking out of clinic.  Pt will benefit from continued PT.        Visit Diagnoses:    ICD-10-CM ICD-9-CM   1. Left-sided low back pain with left-sided sciatica, unspecified chronicity  M54.42 724.3   2. Joint stiffness of spine  M25.60 719.58       Progress per Plan of Care and Progress strengthening /stabilization /functional activity           Timed:  Manual Therapy:    8     mins  82766;  Therapeutic Exercise:    22     mins  51285;     Neuromuscular Dilcia:        mins  07945;    Therapeutic Activity:          mins  67588;     Gait Training:           mins  83200;     Ultrasound:          mins  29084;    Electrical Stimulation:         mins  25604 ( );  Aquatic Therapy          mins  30312    Untimed:  Electrical Stimulation:         mins  53014 ( );  Mechanical Traction:         mins  51156;     Timed Treatment:   30   mins   Total Treatment:     30   mins    Electronically signed    Zaynab Dejesus PTA  Physical Therapist Assistant    CINDY license: [id number]

## 2023-11-21 ENCOUNTER — TREATMENT (OUTPATIENT)
Dept: PHYSICAL THERAPY | Facility: CLINIC | Age: 73
End: 2023-11-21
Payer: MEDICARE

## 2023-11-21 DIAGNOSIS — M25.60 JOINT STIFFNESS OF SPINE: ICD-10-CM

## 2023-11-21 DIAGNOSIS — M54.42 LEFT-SIDED LOW BACK PAIN WITH LEFT-SIDED SCIATICA, UNSPECIFIED CHRONICITY: Primary | ICD-10-CM

## 2023-11-21 NOTE — PROGRESS NOTES
Physical Therapy Daily Treatment Note      Patient: Shalonda Hart   : 1950  Referring practitioner: Juan Antonio Gagnon,*  Date of Initial Visit: Type: THERAPY  Noted: 10/31/2023  Today's Date: 2023  Patient seen for 7 sessions           Subjective Questionnaire:       Subjective Evaluation    History of Present Illness    Subjective comment: Pt presents to clinic with c/o L sided low back pain and L hip pain /10.   Pt reports she had just driven here from [employer] and had 3/10 pain getting into car.       Objective   See Exercise, Manual, and Modality Logs for complete treatment.       Assessment & Plan       Assessment  Assessment details: Pt came into clinic with increased pain today.  Therapist performed manual first then pt was able to perform some core strengthening.  Pt reported 2-3/10 pain after session.  Continue with POC.        Visit Diagnoses:    ICD-10-CM ICD-9-CM   1. Left-sided low back pain with left-sided sciatica, unspecified chronicity  M54.42 724.3   2. Joint stiffness of spine  M25.60 719.58       Progress per Plan of Care and Progress strengthening /stabilization /functional activity           Timed:  Manual Therapy:    15     mins  41075;  Therapeutic Exercise:    15     mins  91173;     Neuromuscular Dilcia:        mins  21443;    Therapeutic Activity:          mins  52125;     Gait Training:           mins  40985;     Ultrasound:          mins  26153;    Electrical Stimulation:         mins  44346 ( );  Aquatic Therapy          mins  34029    Untimed:  Electrical Stimulation:         mins  57085 ( );  Mechanical Traction:         mins  12726;     Timed Treatment:   30   mins   Total Treatment:     30   mins    Electronically signed    Zaynab Dejesus PTA  Physical Therapist Assistant    CINDY license: [id number]

## 2023-11-28 ENCOUNTER — TREATMENT (OUTPATIENT)
Dept: PHYSICAL THERAPY | Facility: CLINIC | Age: 73
End: 2023-11-28
Payer: MEDICARE

## 2023-11-28 DIAGNOSIS — M25.60 JOINT STIFFNESS OF SPINE: ICD-10-CM

## 2023-11-28 DIAGNOSIS — M54.42 LEFT-SIDED LOW BACK PAIN WITH LEFT-SIDED SCIATICA, UNSPECIFIED CHRONICITY: Primary | ICD-10-CM

## 2023-11-28 NOTE — PROGRESS NOTES
Progress Note  [facility] [address] [place] [address]      Patient: Shalonda Hart   : 1950  Diagnosis/ICD-10 Code:  Left-sided low back pain with left-sided sciatica, unspecified chronicity [M54.42]  Referring practitioner: Juan Antonio Gagnon,*  Date of Initial Visit: Type: THERAPY  Noted: 10/31/2023  Today's Date: 2023  Patient seen for 8 sessions      Subjective:   Subjective Questionnaire: Oswestry:  = 18% Disability   Clinical Progress: improved  Home Program Compliance: Yes  Treatment has included: therapeutic exercise, manual therapy, and therapeutic activity    Subjective   The patient reported that her pain level today is a 0/10. Over the past month, she feels like most of her symptoms have improved. Her pain complaint at this time is burning in the left side of her low back and a pain that travels down the back of her leg to knee level. This pain isn't constant. It does still occur when she sits or rides in a car for too long. She has a car ride to [place] planned for . She purchased a lumbar support for her car and hopes that it helps with the car rides. She hasn't received it in the mail yet.    Objective          Tenderness     Additional Tenderness Details  Tenderness in the left lumbar paraspinals, left piriformis, and left SI joint.    Neurological Testing     Additional Neurological Details  Sensation to light touch intact and equal bilaterally from L2-S1 dermatomal pattern.    Seated slump: (+) on left for increased sciatic neural tension, (-) on right    Supine passive SLR: (+) on the left at 30 degrees, (-) on right    Active Range of Motion     Lumbar   Flexion: 55 degrees   Extension: 15 degrees   Left lateral flexion: Active left lumbar lateral flexion: knee joint line.   Right lateral flexion: Active right lumbar lateral flexion: knee joint line. with pain  Left rotation: Active left lumbar rotation: 75%   Right rotation: Active right  lumbar rotation: 75% with pain    Strength/Myotome Testing     Left Hip   Planes of Motion   Flexion: 4-  Extension: 3+  Abduction: 4-    Right Hip   Planes of Motion   Flexion: 4  Extension: 4-  Abduction: 4    Left Knee   Flexion: 4+  Extension: 4+    Right Knee   Flexion: 4+  Extension: 4+    Left Ankle/Foot   Dorsiflexion: 4    Right Ankle/Foot   Dorsiflexion: 4+    Muscle Activation     Additional Muscle Activation Details  Decreased left sided glut activation noted with prone hip extension.    Ambulation     Ambulation: Level Surfaces     Additional Level Surfaces Ambulation Details  The patient ambulates with normal biomechanics of gait.      See Exercise, Manual, and Modality Logs for complete treatment.     Assessment & Plan       Assessment  Assessment details: The patient was re-evaluated today and presents with improvements in low back pain, lumbar flexibility, lower extremity strength, and self-rated function (LARRY) compared to her initial evaluation. Although improved, she continues to present with intermittent left sided low back pain with radicular symptoms, mild hip weakness, and mild lumbar stiffness. She would benefit from continued skilled physical therapy to address remaining functional deficits and to allow the patient to return to her prior level of function.     Goals  Plan Goals:  LOW BACK PROBLEMS:     1. The patient complains of low back pain.  LTG 1: 12 weeks:  The patient will report a pain rating of 1/10 or better in order to improve  tolerance to activities of daily living and improve sleep quality.  STATUS:  Met  STG 1a: 6 weeks:  The patient will report a pain rating of 3/10 or better.  STATUS:  Met     2. The patient demonstrates weakness of the left hip.  LTG 2: 12 weeks:  The patient will demonstrate 4+ /5 strength for left hip flexion, abduction,  and extension in order to improve hip stability.  STATUS:  Progressing  STG 2a: 6 weeks:  The patient will demonstrate 4 /5 strength  for left hip flexion, abduction,  and extension.  STATUS:  Progressing     3. The patient has increased left sided sciatic neural tension with supine passive SLR test.  LTG 3: 12 weeks:  The patient will demonstrate a reduction in sciatic neural tension by completing SLR test to 60 degrees of hip flexion or greater.  STATUS:  Progressing  STG 3a: 6 weeks: The patient will demonstrate a reduction in sciatic neural tension by completing SLR test to 40 degrees of hip flexion or greater.    STATUS:  Progressing     4. The patient has limited lumbar AROM  LTG 4: 12 weeks:  The patient will demonstrate lumbar AROM as follows: 65 of flexion and 20 degrees of extension.  STATUS:  Progressing  STG 4a: 6 weeks: The patient will demonstrate lumbar AROM as follows: 60 of flexion and 15 degrees of extension.  STATUS:  Progressing     5. Mobility: Walking/Moving Around Functional Limitation                   LTG 5: 12 weeks:  The patient will demonstrate 10 % limitation by achieving a score of 5/50 on the LARRY.  STATUS:  Progressing  STG 5 a: 6 weeks:  The patient will demonstrate 30 % limitation by achieving a score of 15/50 on the LARRY.    STATUS:  Met        Progress toward previous goals: Partially Met      Recommendations: Continue as planned  Timeframe: 1 month  Prognosis to achieve goals: good    PT Signature: Elvis Aldana PT    Electronically signed 2023    [place] License: PT - [id number]       Timed:  Manual Therapy:    10     mins  50670;  Therapeutic Exercise:    5     mins  09140;     Neuromuscular Dilcia:    0    mins  82581;    Therapeutic Activity:     10     mins  08876;     Gait Trainin     mins  87578;     Aquatics                         0      mins  69430    Un-timed:  Mechanical Traction      0     mins  96348  Dry Needling     0     mins self-pay  Electrical Stimulation:    0     mins  92575 ( );    Timed Treatment:   25   mins   Total Treatment:     30   mins

## 2023-11-30 ENCOUNTER — TREATMENT (OUTPATIENT)
Dept: PHYSICAL THERAPY | Facility: CLINIC | Age: 73
End: 2023-11-30
Payer: MEDICARE

## 2023-11-30 DIAGNOSIS — M25.60 JOINT STIFFNESS OF SPINE: ICD-10-CM

## 2023-11-30 DIAGNOSIS — M54.42 LEFT-SIDED LOW BACK PAIN WITH LEFT-SIDED SCIATICA, UNSPECIFIED CHRONICITY: Primary | ICD-10-CM

## 2023-11-30 NOTE — PROGRESS NOTES
Patient states he was asked to be a part of a study because of being a long term smoker. He did it over a year ago and had a repeat in March. He thought that this test was part of the study thought he signed up for. He received a bill for over $400 for the CT. Please advise.   Physical Therapy Daily Treatment Note      Patient: Shalonda Hart   : 1950  Referring practitioner: Juan Antonio Gagnon,*  Date of Initial Visit: Type: THERAPY  Noted: 10/31/2023  Today's Date: 2023  Patient seen for 9 sessions           Subjective Questionnaire:       Subjective Evaluation    History of Present Illness    Subjective comment: Pt reports her back feels good right now.  Pt reports she still has bad days.  Pt stated she has a chair in her living room that she doesn't need to sit in it secondary to increased R hip pain.       Objective   See Exercise, Manual, and Modality Logs for complete treatment.       Assessment & Plan       Assessment  Assessment details: Pt tolerated strengthening well.  Pt reported back felt good after manual work and continued with strengthening.  Pt will benefit from continued PT.        Visit Diagnoses:    ICD-10-CM ICD-9-CM   1. Left-sided low back pain with left-sided sciatica, unspecified chronicity  M54.42 724.3   2. Joint stiffness of spine  M25.60 719.58       Progress per Plan of Care and Progress strengthening /stabilization /functional activity           Timed:  Manual Therapy:    9     mins  73085;  Therapeutic Exercise:    19     mins  76893;     Neuromuscular Dilcia:        mins  05204;    Therapeutic Activity:     10     mins  22150;     Gait Training:           mins  50215;     Ultrasound:          mins  20755;    Electrical Stimulation:         mins  89584 ( );  Aquatic Therapy          mins  34533    Untimed:  Electrical Stimulation:         mins  85968 ( );  Mechanical Traction:         mins  36423;     Timed Treatment:   38   mins   Total Treatment:     38   mins    Electronically signed    Zaynab Dejesus PTA  Physical Therapist Assistant    CINDY license: [id number]

## 2023-12-04 ENCOUNTER — TREATMENT (OUTPATIENT)
Dept: PHYSICAL THERAPY | Facility: CLINIC | Age: 73
End: 2023-12-04
Payer: MEDICARE

## 2023-12-04 DIAGNOSIS — M25.60 JOINT STIFFNESS OF SPINE: ICD-10-CM

## 2023-12-04 DIAGNOSIS — M54.42 LEFT-SIDED LOW BACK PAIN WITH LEFT-SIDED SCIATICA, UNSPECIFIED CHRONICITY: Primary | ICD-10-CM

## 2023-12-04 PROCEDURE — 97110 THERAPEUTIC EXERCISES: CPT | Performed by: PHYSICAL THERAPIST

## 2023-12-04 PROCEDURE — 97530 THERAPEUTIC ACTIVITIES: CPT | Performed by: PHYSICAL THERAPIST

## 2023-12-04 PROCEDURE — 97140 MANUAL THERAPY 1/> REGIONS: CPT | Performed by: PHYSICAL THERAPIST

## 2023-12-04 NOTE — PROGRESS NOTES
Physical Therapy Daily Treatment Note      Patient: Shalonda Hart   : 1950  Referring practitioner: Juan Antonio Gagnon,*  Date of Initial Visit: Type: THERAPY  Noted: 10/31/2023  Today's Date: 2023  Patient seen for 10 sessions           Subjective Questionnaire:       Subjective Evaluation    History of Present Illness    Subjective comment: Pt reported having no back pain today.       Objective   See Exercise, Manual, and Modality Logs for complete treatment.       Assessment & Plan       Assessment  Assessment details: Pt reported fatigue in BLE after standing exercise with wt.  Pt tolerated strengthening well.  Pt  is progressing and will benefit from continued PT.          Visit Diagnoses:    ICD-10-CM ICD-9-CM   1. Left-sided low back pain with left-sided sciatica, unspecified chronicity  M54.42 724.3   2. Joint stiffness of spine  M25.60 719.58       Progress per Plan of Care and Progress strengthening /stabilization /functional activity           Timed:  Manual Therapy:    8     mins  29530;  Therapeutic Exercise:    22     mins  36993;     Neuromuscular Dilcia:        mins  23508;    Therapeutic Activity:    8      mins  10545;     Gait Training:           mins  43360;     Ultrasound:          mins  64954;    Electrical Stimulation:         mins  70571 ( );  Aquatic Therapy          mins  07163    Untimed:  Electrical Stimulation:         mins  12911 ( );  Mechanical Traction:         mins  75185;     Timed Treatment:   38   mins   Total Treatment:     38   mins    Electronically signed    Zaynab Dejesus PTA  Physical Therapist Assistant    CINDY license: [id number]

## 2023-12-06 ENCOUNTER — TREATMENT (OUTPATIENT)
Dept: PHYSICAL THERAPY | Facility: CLINIC | Age: 73
End: 2023-12-06
Payer: MEDICARE

## 2023-12-06 DIAGNOSIS — M54.42 LEFT-SIDED LOW BACK PAIN WITH LEFT-SIDED SCIATICA, UNSPECIFIED CHRONICITY: Primary | ICD-10-CM

## 2023-12-06 DIAGNOSIS — M25.60 JOINT STIFFNESS OF SPINE: ICD-10-CM

## 2023-12-06 NOTE — PROGRESS NOTES
Physical Therapy Daily Treatment Note      Patient: Shalonda Hart   : 1950  Referring practitioner: Juan Antonio Gagnon,*  Date of Initial Visit: Type: THERAPY  Noted: 10/31/2023  Today's Date: 2023  Patient seen for 11 sessions           Subjective Questionnaire:       Subjective Evaluation    History of Present Illness    Subjective comment: Pt reported that leaving her home to come to PT she didn't have L hip pain but riding to clinic and getting out of the car increased her pain to 2-3/10.  Pt reported that after cycling her pain was down to 1-2/10.       Objective   See Exercise, Manual, and Modality Logs for complete treatment.       Assessment & Plan       Assessment  Assessment details: Pt tolerated strengthening well with no other reports of pain or discomfort.  Pt did not tolerate lying prone today stating her R rib was sore. Pt's back and hip pain are improving.  Pt is compliant with HEP.  Continue with POC.        Visit Diagnoses:    ICD-10-CM ICD-9-CM   1. Left-sided low back pain with left-sided sciatica, unspecified chronicity  M54.42 724.3   2. Joint stiffness of spine  M25.60 719.58       Progress per Plan of Care and Progress strengthening /stabilization /functional activity           Timed:  Manual Therapy:         mins  22893;  Therapeutic Exercise:    16     mins  79569;     Neuromuscular Dilcia:        mins  68275;    Therapeutic Activity:     12     mins  91386;     Gait Training:           mins  30500;     Ultrasound:          mins  73796;    Electrical Stimulation:         mins  99912 ( );  Aquatic Therapy          mins  67449    Untimed:  Electrical Stimulation:         mins  49373 ( );  Mechanical Traction:         mins  90511;     Timed Treatment:   28   mins   Total Treatment:     28   mins    Electronically signed    Zaynab Dejesus PTA  Physical Therapist Assistant    CINDY license: [id number]

## 2023-12-07 ENCOUNTER — OFFICE VISIT (OUTPATIENT)
Dept: FAMILY MEDICINE CLINIC | Facility: CLINIC | Age: 73
End: 2023-12-07
Payer: MEDICARE

## 2023-12-07 VITALS
HEIGHT: 67 IN | OXYGEN SATURATION: 97 % | TEMPERATURE: 97.8 F | HEART RATE: 88 BPM | WEIGHT: 199 LBS | BODY MASS INDEX: 31.23 KG/M2 | SYSTOLIC BLOOD PRESSURE: 130 MMHG | DIASTOLIC BLOOD PRESSURE: 73 MMHG

## 2023-12-07 DIAGNOSIS — G89.29 CHRONIC LOW BACK PAIN, UNSPECIFIED BACK PAIN LATERALITY, UNSPECIFIED WHETHER SCIATICA PRESENT: ICD-10-CM

## 2023-12-07 DIAGNOSIS — M54.50 CHRONIC LOW BACK PAIN, UNSPECIFIED BACK PAIN LATERALITY, UNSPECIFIED WHETHER SCIATICA PRESENT: ICD-10-CM

## 2023-12-07 DIAGNOSIS — F41.9 ANXIETY: Primary | ICD-10-CM

## 2023-12-07 DIAGNOSIS — F33.0 MILD EPISODE OF RECURRENT MAJOR DEPRESSIVE DISORDER: ICD-10-CM

## 2023-12-07 DIAGNOSIS — I10 PRIMARY HYPERTENSION: ICD-10-CM

## 2023-12-07 DIAGNOSIS — E03.9 ACQUIRED HYPOTHYROIDISM: ICD-10-CM

## 2023-12-07 DIAGNOSIS — K21.00 GASTROESOPHAGEAL REFLUX DISEASE WITH ESOPHAGITIS WITHOUT HEMORRHAGE: ICD-10-CM

## 2023-12-07 LAB
ALBUMIN SERPL-MCNC: 4.6 G/DL (ref 3.5–5.2)
ALBUMIN/GLOB SERPL: 2 G/DL
ALP SERPL-CCNC: 57 U/L (ref 39–117)
ALT SERPL W P-5'-P-CCNC: 17 U/L (ref 1–33)
ANION GAP SERPL CALCULATED.3IONS-SCNC: 10.4 MMOL/L (ref 5–15)
AST SERPL-CCNC: 18 U/L (ref 1–32)
BILIRUB SERPL-MCNC: 0.3 MG/DL (ref 0–1.2)
BUN SERPL-MCNC: 8 MG/DL (ref 8–23)
BUN/CREAT SERPL: 9.6 (ref 7–25)
CALCIUM SPEC-SCNC: 10.3 MG/DL (ref 8.6–10.5)
CHLORIDE SERPL-SCNC: 101 MMOL/L (ref 98–107)
CHOLEST SERPL-MCNC: 195 MG/DL (ref 0–200)
CO2 SERPL-SCNC: 24.6 MMOL/L (ref 22–29)
CREAT SERPL-MCNC: 0.83 MG/DL (ref 0.57–1)
DEPRECATED RDW RBC AUTO: 43.9 FL (ref 37–54)
EGFRCR SERPLBLD CKD-EPI 2021: 75 ML/MIN/1.73
ERYTHROCYTE [DISTWIDTH] IN BLOOD BY AUTOMATED COUNT: 13.1 % (ref 12.3–15.4)
GLOBULIN UR ELPH-MCNC: 2.3 GM/DL
GLUCOSE SERPL-MCNC: 114 MG/DL (ref 65–99)
HCT VFR BLD AUTO: 36.9 % (ref 34–46.6)
HDLC SERPL-MCNC: 44 MG/DL (ref 40–60)
HGB BLD-MCNC: 12.7 G/DL (ref 12–15.9)
LDLC SERPL CALC-MCNC: 132 MG/DL (ref 0–100)
LDLC/HDLC SERPL: 2.96 {RATIO}
MCH RBC QN AUTO: 31.9 PG (ref 26.6–33)
MCHC RBC AUTO-ENTMCNC: 34.4 G/DL (ref 31.5–35.7)
MCV RBC AUTO: 92.7 FL (ref 79–97)
PLATELET # BLD AUTO: 271 10*3/MM3 (ref 140–450)
PMV BLD AUTO: 10.6 FL (ref 6–12)
POTASSIUM SERPL-SCNC: 4.2 MMOL/L (ref 3.5–5.2)
PROT SERPL-MCNC: 6.9 G/DL (ref 6–8.5)
RBC # BLD AUTO: 3.98 10*6/MM3 (ref 3.77–5.28)
SODIUM SERPL-SCNC: 136 MMOL/L (ref 136–145)
T4 FREE SERPL-MCNC: 0.94 NG/DL (ref 0.93–1.7)
TRIGL SERPL-MCNC: 104 MG/DL (ref 0–150)
TSH SERPL DL<=0.05 MIU/L-ACNC: 4.99 UIU/ML (ref 0.27–4.2)
VLDLC SERPL-MCNC: 19 MG/DL (ref 5–40)
WBC NRBC COR # BLD AUTO: 6.43 10*3/MM3 (ref 3.4–10.8)

## 2023-12-07 PROCEDURE — 84439 ASSAY OF FREE THYROXINE: CPT | Performed by: FAMILY MEDICINE

## 2023-12-07 PROCEDURE — 3075F SYST BP GE 130 - 139MM HG: CPT | Performed by: FAMILY MEDICINE

## 2023-12-07 PROCEDURE — 80053 COMPREHEN METABOLIC PANEL: CPT | Performed by: FAMILY MEDICINE

## 2023-12-07 PROCEDURE — 99214 OFFICE O/P EST MOD 30 MIN: CPT | Performed by: FAMILY MEDICINE

## 2023-12-07 PROCEDURE — 85027 COMPLETE CBC AUTOMATED: CPT | Performed by: FAMILY MEDICINE

## 2023-12-07 PROCEDURE — 80061 LIPID PANEL: CPT | Performed by: FAMILY MEDICINE

## 2023-12-07 PROCEDURE — 84443 ASSAY THYROID STIM HORMONE: CPT | Performed by: FAMILY MEDICINE

## 2023-12-07 PROCEDURE — 3078F DIAST BP <80 MM HG: CPT | Performed by: FAMILY MEDICINE

## 2023-12-07 RX ORDER — DILTIAZEM HYDROCHLORIDE 240 MG/1
240 CAPSULE, COATED, EXTENDED RELEASE ORAL DAILY
Qty: 90 CAPSULE | Refills: 3 | Status: SHIPPED | OUTPATIENT
Start: 2023-12-07

## 2023-12-07 RX ORDER — LEVOTHYROXINE SODIUM 112 UG/1
112 TABLET ORAL DAILY
Qty: 90 TABLET | Refills: 3 | Status: SHIPPED | OUTPATIENT
Start: 2023-12-07

## 2023-12-07 RX ORDER — CYCLOBENZAPRINE HCL 10 MG
10 TABLET ORAL 3 TIMES DAILY PRN
Qty: 60 TABLET | Refills: 5 | Status: SHIPPED | OUTPATIENT
Start: 2023-12-07

## 2023-12-07 RX ORDER — CITALOPRAM 40 MG/1
40 TABLET ORAL DAILY
Qty: 90 TABLET | Refills: 1 | Status: SHIPPED | OUTPATIENT
Start: 2023-12-07

## 2023-12-07 RX ORDER — LORAZEPAM 1 MG/1
1-2 TABLET ORAL 2 TIMES DAILY PRN
Qty: 30 TABLET | Refills: 5 | Status: SHIPPED | OUTPATIENT
Start: 2023-12-07

## 2023-12-07 RX ORDER — IRBESARTAN 300 MG/1
300 TABLET ORAL DAILY
Qty: 90 TABLET | Refills: 3 | Status: SHIPPED | OUTPATIENT
Start: 2023-12-07

## 2023-12-07 RX ORDER — INDAPAMIDE 2.5 MG/1
2.5 TABLET ORAL DAILY
Qty: 90 TABLET | Refills: 3 | Status: SHIPPED | OUTPATIENT
Start: 2023-12-07

## 2023-12-07 RX ORDER — PANTOPRAZOLE SODIUM 20 MG/1
20 TABLET, DELAYED RELEASE ORAL DAILY
Qty: 90 TABLET | Refills: 3 | Status: SHIPPED | OUTPATIENT
Start: 2023-12-07

## 2023-12-07 NOTE — ASSESSMENT & PLAN NOTE
She feels overall that she is doing better although she still uses the lorazepam nightly.  Will try increasing up her citalopram to see if we can get her lorazepam dosage down to more as needed and infrequent.

## 2023-12-07 NOTE — PROGRESS NOTES
Chief Complaint   Patient presents with    Establish Care     Former  patient   Requesting refill on lorazepam         Subjective     Shalonda Hart  has a past medical history of Anxiety, Arthritis, Colon polyp, Deep vein thrombosis, Depression (09/03/2015), Diverticulitis, Esophageal dysphagia, Fatty liver, Gastroesophageal reflux disease, History of bladder surgery (03/29/2021), History of cataract extraction (03/29/2021), History of cystocele (08/25/2020), History of DVT (deep vein thrombosis) (01/08/2019), History of gastric polyp (03/29/2021), History of repair of rectocele (08/25/2020), Hypothyroid (07/05/2018), Irritable bowel syndrome (IBS), Long-term use of high-risk medication (09/03/2015), Low back pain (06/02/2014), Medication management (07/05/2018), Sinus congestion, Skin lesion (09/03/2015), Sleep apnea, and Status post gastric bypass for obesity (03/21/2015).    Hypertension-she does not check her blood pressure outside the office.  Her blood pressure that was good here today at 130/73.    Hypothyroid-she takes her levothyroxine every morning as prescribed.    Anxiety disorder-she feels her anxiety is better.  She typically takes about 1 a day.  She states she usually more in the evening near the end of her day.  She has not noticed any impairment from it.        PHQ-2 Depression Screening  Little interest or pleasure in doing things?     Feeling down, depressed, or hopeless?     PHQ-2 Total Score     PHQ-9 Depression Screening  Little interest or pleasure in doing things?     Feeling down, depressed, or hopeless?     Trouble falling or staying asleep, or sleeping too much?     Feeling tired or having little energy?     Poor appetite or overeating?     Feeling bad about yourself - or that you are a failure or have let yourself or your family down?     Trouble concentrating on things, such as reading the newspaper or watching television?     Moving or speaking so slowly that other people  could have noticed? Or the opposite - being so fidgety or restless that you have been moving around a lot more than usual?     Thoughts that you would be better off dead, or of hurting yourself in some way?     PHQ-9 Total Score     If you checked off any problems, how difficult have these problems made it for you to do your work, take care of things at home, or get along with other people?       Allergies   Allergen Reactions    Hydrochlorothiazide Swelling     ORAL SWELLING/SORES    Sulfa Antibiotics Rash     SWELLING/RASH    Tape Other (See Comments)     Pt gets blisters with plastic tape.    Lidocaine Swelling and Rash     Can not use TOPICAL LIDOCAINE. Does ok with the injectable lidocaine.         Prior to Admission medications    Medication Sig Start Date End Date Taking? Authorizing Provider   Calcium Citrate-Vitamin D 500-500 MG-UNIT chewable tablet Chew.   Yes Meeta Ayala MD   carboxymethylcellulose (REFRESH PLUS) 0.5 % solution 3 (Three) Times a Day As Needed.   Yes Meeta Ayala MD   Cholecalciferol 50 MCG (2000 UT) tablet Take  by mouth Daily.   Yes Meeta Ayala MD   citalopram (CeleXA) 20 MG tablet Take 1 tablet by mouth Daily. 6/5/23  Yes Alo Viramontes III, MD   cyclobenzaprine (FLEXERIL) 10 MG tablet Take 1 tablet by mouth 3 (Three) Times a Day As Needed for Muscle Spasms. 6/5/23  Yes Alo Viramontes III, MD   dilTIAZem CD (CARDIZEM CD) 240 MG 24 hr capsule Take 1 capsule by mouth Daily. 6/5/23  Yes Alo Viramontes III, MD   docusate calcium (SURFAK) 240 MG capsule Take 1 capsule by mouth 2 (Two) Times a Day.   Yes Meeta Ayala MD   fexofenadine (ALLEGRA) 180 MG tablet Take 1 tablet by mouth Daily As Needed.   Yes Meeta Ayala MD   indapamide (LOZOL) 2.5 MG tablet Take 1 tablet by mouth Daily. 6/5/23  Yes Alo Viramontes III, MD   irbesartan (AVAPRO) 300 MG tablet Take 1 tablet by mouth Daily. 6/5/23  Yes Alo Viramontes III, MD   levothyroxine (SYNTHROID,  LEVOTHROID) 112 MCG tablet Take 1 tablet by mouth Daily. 23  Yes Alo Viramontes III, MD   LORazepam (ATIVAN) 1 MG tablet Take 1-2 tablets by mouth 2 (Two) Times a Day. 23  Yes Alo Viramontes III, MD   multivitamin-iron-minerals-folic acid (CENTRUM) chewable tablet Chew 1 tablet Daily.   Yes Meeta Ayala MD   multivitamins-minerals (PRESERVISION AREDS 2) capsule capsule Take 1 capsule by mouth 2 (Two) Times a Day.   Yes Meeta Ayala MD   pantoprazole (PROTONIX) 20 MG EC tablet Take 1 tablet by mouth Daily. 23  Yes Alo Viramontes III, MD   Psyllium (METAMUCIL FIBER PO) Take  by mouth At Night As Needed.   Yes Meeta Ayala MD   saccharomyces boulardii (FLORASTOR) 250 MG capsule Take 1 capsule by mouth. Only when on antibiotic   Yes Emergency, Nurse Epic, RN   Simethicone (GAS-X PO) Take  by mouth Every 4 (Four) Hours As Needed.   Yes Meeta Ayala MD   vitamin B-12 (CYANOCOBALAMIN) 1000 MCG tablet Take 1 tablet by mouth Daily.   Yes Meeta Ayala MD   HYDROcodone-acetaminophen (NORCO) 5-325 MG per tablet Take 1 tablet by mouth.  21  Meeta Ayala MD   methylPREDNISolone (MEDROL) 4 MG dose pack Take as directed on package instructions.  Patient not taking: Reported on 2023 10/19/23 12/7/23  Juan Antonio Gagnon,         Patient Active Problem List   Diagnosis    Medication management    Low back pain    Hypothyroid    Hypertension    Gastroesophageal reflux disease    Fatty liver    Esophageal dysphagia    Depression    Anxiety    Arthritis    Hx of bariatric surgery    History of suburethral sling procedure    Midline cystocele    Morbid (severe) obesity due to excess calories    Vaginal vault prolapse    Rectocele    Polyp of colon    Acute left-sided low back pain with left-sided sciatica        Past Surgical History:   Procedure Laterality Date    APPENDECTOMY      BARIATRIC SURGERY      Gastric Sleeve     SECTION       CHOLECYSTECTOMY  1989    COLONOSCOPY  2017    COLONOSCOPY N/A 8/31/2023    Procedure: COLONOSCOPY FOR SCREENING with polypectomy with cold snare;  Surgeon: Destini Randolph MD;  Location: [facility] ENDOSCOPY;  Service: Gastroenterology;  Laterality: N/A;  colon polyp, diverticulosis    CYSTOCELE REPAIR  07/15/2020    GASTRIC SLEEVE LAPAROSCOPIC  2019    HYSTERECTOMY  1984    RECTOCELE REPAIR  07/15/2020    SHOULDER SURGERY Left 2001    TONSILLECTOMY  1968    UPPER GASTROINTESTINAL ENDOSCOPY      VAGINAL REPAIR  07/15/2020    vaginal wall lift       Social History     Socioeconomic History    Marital status:    Tobacco Use    Smoking status: Never    Smokeless tobacco: Never   Vaping Use    Vaping Use: Never used   Substance and Sexual Activity    Alcohol use: Yes     Comment: rarely    Drug use: Yes     Types: Benzodiazepines     Comment: lorazepam    Sexual activity: Yes     Partners: Male     Birth control/protection: Hysterectomy     Comment: Spouse only       Family History   Problem Relation Age of Onset    Heart disease Mother     Diabetes Mother     Depression Mother     Hypertension Mother     Hypertension Father     Diabetes Sister     Depression Sister     Hypertension Sister     Heart disease Sister     Kidney disease Sister     Hypertension Brother     Heart disease Brother     Diabetes Brother     Kidney disease Brother     Diabetes Sister     Heart disease Sister     Hypertension Sister     Malig Hyperthermia Neg Hx        Family history, surgical history, past medical history, Allergies and meds reviewed with patient today and updated in [facility] EMR.     ROS:  Review of Systems   Constitutional:  Negative for fatigue.   Eyes:  Negative for visual disturbance.   Respiratory:  Negative for cough, chest tightness, shortness of breath and wheezing.    Cardiovascular:  Negative for chest pain and palpitations.   Psychiatric/Behavioral:  The patient is nervous/anxious.        OBJECTIVE:  Vitals:     "12/07/23 0854   BP: 130/73   BP Location: Left arm   Patient Position: Sitting   Pulse: 88   Temp: 97.8 °F (36.6 °C)   SpO2: 97%   Weight: 90.3 kg (199 lb)   Height: 170.2 cm (67\")     No results found.   Body mass index is 31.17 kg/m².  No LMP recorded. Patient has had a hysterectomy.    Physical Exam  Vitals and nursing note reviewed.   Constitutional:       General: She is not in acute distress.     Appearance: Normal appearance. She is normal weight.   HENT:      Head: Normocephalic.   Cardiovascular:      Rate and Rhythm: Normal rate and regular rhythm.      Heart sounds: Normal heart sounds. No murmur heard.  Pulmonary:      Effort: Pulmonary effort is normal.      Breath sounds: Normal breath sounds. No wheezing.   Neurological:      Mental Status: She is alert and oriented to person, place, and time.   Psychiatric:         Mood and Affect: Mood normal.         Thought Content: Thought content normal.         Judgment: Judgment normal.         Procedures    No visits with results within 30 Day(s) from this visit.   Latest known visit with results is:   Admission on 08/31/2023, Discharged on 08/31/2023   Component Date Value Ref Range Status    Case Report 08/31/2023    Final                    Value:Surgical Pathology Report                         Case: [id number]                                  Authorizing Provider:  Destini Randolph MD Collected:           08/31/2023 07:42 AM          Ordering Location:     [facility] Received:            08/31/2023 09:57 AM                                 SUITES                                                                       Pathologist:           Latricia Higuera DO                                                       Specimen:    Large Intestine, Right / Ascending Colon, ascending colon polyp                            Clinical Information 08/31/2023    Final                    Value:This result contains rich text formatting which " cannot be displayed here.    Final Diagnosis 08/31/2023    Final                    Value:This result contains rich text formatting which cannot be displayed here.    Gross Description 08/31/2023    Final                    Value:This result contains rich text formatting which cannot be displayed here.    Microscopic Description 08/31/2023    Final                    Value:This result contains rich text formatting which cannot be displayed here.       ASSESSMENT/ PLAN:    Diagnoses and all orders for this visit:    1. Anxiety (Primary)  Assessment & Plan:  She feels overall that she is doing better although she still uses the lorazepam nightly.  Will try increasing up her citalopram to see if we can get her lorazepam dosage down to more as needed and infrequent.    Orders:  -     LORazepam (ATIVAN) 1 MG tablet; Take 1-2 tablets by mouth 2 (Two) Times a Day As Needed for Anxiety.  Dispense: 30 tablet; Refill: 5    2. Acquired hypothyroidism  Assessment & Plan:  Will update her thyroid profile with her labs.    Orders:  -     levothyroxine (SYNTHROID, LEVOTHROID) 112 MCG tablet; Take 1 tablet by mouth Daily.  Dispense: 90 tablet; Refill: 3  -     TSH+Free T4    3. Primary hypertension  Assessment & Plan:  Her blood pressure is good here today.  We will update her labs.    Orders:  -     irbesartan (AVAPRO) 300 MG tablet; Take 1 tablet by mouth Daily.  Dispense: 90 tablet; Refill: 3  -     indapamide (LOZOL) 2.5 MG tablet; Take 1 tablet by mouth Daily.  Dispense: 90 tablet; Refill: 3  -     dilTIAZem CD (CARDIZEM CD) 240 MG 24 hr capsule; Take 1 capsule by mouth Daily.  Dispense: 90 capsule; Refill: 3  -     Comprehensive Metabolic Panel  -     Lipid Panel  -     CBC (No Diff)    4. Gastroesophageal reflux disease with esophagitis without hemorrhage  -     pantoprazole (PROTONIX) 20 MG EC tablet; Take 1 tablet by mouth Daily.  Dispense: 90 tablet; Refill: 3    5. Mild episode of recurrent major depressive disorder  -      citalopram (CeleXA) 40 MG tablet; Take 1 tablet by mouth Daily.  Dispense: 90 tablet; Refill: 1    6. Chronic low back pain, unspecified back pain laterality, unspecified whether sciatica present  -     cyclobenzaprine (FLEXERIL) 10 MG tablet; Take 1 tablet by mouth 3 (Three) Times a Day As Needed for Muscle Spasms.  Dispense: 60 tablet; Refill: 5               Orders Placed Today:     New Medications Ordered This Visit   Medications    levothyroxine (SYNTHROID, LEVOTHROID) 112 MCG tablet     Sig: Take 1 tablet by mouth Daily.     Dispense:  90 tablet     Refill:  3    irbesartan (AVAPRO) 300 MG tablet     Sig: Take 1 tablet by mouth Daily.     Dispense:  90 tablet     Refill:  3    pantoprazole (PROTONIX) 20 MG EC tablet     Sig: Take 1 tablet by mouth Daily.     Dispense:  90 tablet     Refill:  3    indapamide (LOZOL) 2.5 MG tablet     Sig: Take 1 tablet by mouth Daily.     Dispense:  90 tablet     Refill:  3    citalopram (CeleXA) 40 MG tablet     Sig: Take 1 tablet by mouth Daily.     Dispense:  90 tablet     Refill:  1    dilTIAZem CD (CARDIZEM CD) 240 MG 24 hr capsule     Sig: Take 1 capsule by mouth Daily.     Dispense:  90 capsule     Refill:  3    cyclobenzaprine (FLEXERIL) 10 MG tablet     Sig: Take 1 tablet by mouth 3 (Three) Times a Day As Needed for Muscle Spasms.     Dispense:  60 tablet     Refill:  5    LORazepam (ATIVAN) 1 MG tablet     Sig: Take 1-2 tablets by mouth 2 (Two) Times a Day As Needed for Anxiety.     Dispense:  30 tablet     Refill:  5        Management Plan:     An After Visit Summary was printed and given to the patient at discharge.    Follow-up: Return in about 6 months (around 6/7/2024) for Recheck.    Juan Antonio Gagnon DO 12/7/2023 09:41 EST  This note was electronically signed.

## 2023-12-12 ENCOUNTER — TREATMENT (OUTPATIENT)
Dept: PHYSICAL THERAPY | Facility: CLINIC | Age: 73
End: 2023-12-12
Payer: MEDICARE

## 2023-12-12 DIAGNOSIS — M54.42 LEFT-SIDED LOW BACK PAIN WITH LEFT-SIDED SCIATICA, UNSPECIFIED CHRONICITY: Primary | ICD-10-CM

## 2023-12-12 DIAGNOSIS — M25.60 JOINT STIFFNESS OF SPINE: ICD-10-CM

## 2023-12-12 NOTE — PROGRESS NOTES
Physical Therapy Daily Treatment Note      Patient: Shalonda Hart   : 1950  Referring practitioner: Juan Antonio Gagnon,*  Date of Initial Visit: Type: THERAPY  Noted: 10/31/2023  Today's Date: 2023  Patient seen for 12 sessions           Subjective Questionnaire:       Subjective Evaluation    History of Present Illness    Subjective comment: Pt reports she doesn't have any low back pain but has L hip pain 2/10 that goes from piriformis down to mid hamstring then jumps to proximal knee.       Objective   See Exercise, Manual, and Modality Logs for complete treatment.       Assessment & Plan       Assessment  Assessment details: Pt reports side stepping aggravated her L hip.  Pt tolerated all other strengthening well.  Pt reported anterior rib pain with UPAs so discontinued and performed muscle release to relieve pain.  Pt reported it was sore after session.  Continue with POC.        Visit Diagnoses:    ICD-10-CM ICD-9-CM   1. Left-sided low back pain with left-sided sciatica, unspecified chronicity  M54.42 724.3   2. Joint stiffness of spine  M25.60 719.58       Progress per Plan of Care and Progress strengthening /stabilization /functional activity           Timed:  Manual Therapy:   8      mins  64203;  Therapeutic Exercise:    20     mins  04241;     Neuromuscular Dilcia:        mins  33984;    Therapeutic Activity:     10     mins  64288;     Gait Training:           mins  11698;     Ultrasound:          mins  03205;    Electrical Stimulation:         mins  61573 ( );  Aquatic Therapy          mins  68100    Untimed:  Electrical Stimulation:         mins  77882 ( );  Mechanical Traction:         mins  35842;     Timed Treatment:   38   mins   Total Treatment:     38   mins    Electronically signed    Zaynab Dejesus PTA  Physical Therapist Assistant    [employer] license: [id number]

## 2023-12-14 ENCOUNTER — TREATMENT (OUTPATIENT)
Dept: PHYSICAL THERAPY | Facility: CLINIC | Age: 73
End: 2023-12-14
Payer: MEDICARE

## 2023-12-14 DIAGNOSIS — M54.42 LEFT-SIDED LOW BACK PAIN WITH LEFT-SIDED SCIATICA, UNSPECIFIED CHRONICITY: Primary | ICD-10-CM

## 2023-12-14 DIAGNOSIS — M25.60 JOINT STIFFNESS OF SPINE: ICD-10-CM

## 2023-12-14 NOTE — PROGRESS NOTES
Physical Therapy Daily Treatment Note                      [facility] [address] [place] [address]    Patient: Shalonda Hatr   : 1950  Diagnosis/ICD-10 Code:  Left-sided low back pain with left-sided sciatica, unspecified chronicity [M54.42]  Referring practitioner: Juan Antonio Gagnon,*  Date of Initial Visit: Type: THERAPY  Noted: 10/31/2023  Today's Date: 2023  Patient seen for 13 sessions           Subjective   The patient reported that her back and left hip both feel pretty good today. She would like to come back for one more visit after her trip to [place].    Objective   See Exercise, Manual, and Modality Logs for complete treatment.     Assessment/Plan  The patient demonstrated good tolerance to all functional hip/core strengthening exercise. She will be re-evaluated after her trip to [place].       Timed:  Manual Therapy:    0     mins  30073;  Therapeutic Exercise:    16     mins  99135;     Neuromuscular Dilcia:   0    mins  17113;    Therapeutic Activity:     14     mins  06072;     Gait Trainin     mins  71086;     Aquatics                         0      mins  86044    Un-timed:  Mechanical Traction      0     mins  25075  Dry Needling     0     mins self-pay  Electrical Stimulation:    0     mins  66821 ( );      Timed Treatment:   30   mins   Total Treatment:     30   mins    Elvis Aldana PT  Physical Therapist    Electronically signed 2023    [place] License: PT - [id number]

## 2024-01-03 ENCOUNTER — TREATMENT (OUTPATIENT)
Dept: PHYSICAL THERAPY | Facility: CLINIC | Age: 74
End: 2024-01-03
Payer: MEDICARE

## 2024-01-03 DIAGNOSIS — M54.42 LEFT-SIDED LOW BACK PAIN WITH LEFT-SIDED SCIATICA, UNSPECIFIED CHRONICITY: Primary | ICD-10-CM

## 2024-01-03 DIAGNOSIS — M25.60 JOINT STIFFNESS OF SPINE: ICD-10-CM

## 2024-01-03 NOTE — PROGRESS NOTES
Progress Note / Discharge  [facility] [address] [place] [address]      Patient: Shalonda Hart   : 1950  Diagnosis/ICD-10 Code:  Left-sided low back pain with left-sided sciatica, unspecified chronicity [M54.42]  Referring practitioner: Juan Antonio Gagnon,*  Date of Initial Visit: Type: THERAPY  Noted: 10/31/2023  Today's Date: 1/3/2024  Patient seen for 14 sessions      Subjective:   Subjective Questionnaire: Oswestry:  = 16% Disability  Clinical Progress: improved  Home Program Compliance: Yes  Treatment has included: therapeutic exercise, manual therapy, and therapeutic activity    Subjective   The patient reported that her pain today is a 0/10. Over the past month, she has noticed a continued reduction in frequency and intensity of sciatic pain into her left leg. She was able to ride in a car to [place] recently. They took breaks and she did her exercises in the back of the car as well as walked around. She only experienced minimal symptoms on this trip. Her sciatic pain does flare up more if she drives instead of riding as it is more difficult to stretch. She feels comfortable with self-management of her symptoms at this time and is agreeable to discharge from PT.    Objective          Tenderness     Additional Tenderness Details  Tenderness in the left lumbar paraspinals, left piriformis, and left SI joint.    Neurological Testing     Additional Neurological Details  Sensation to light touch intact and equal bilaterally from L2-S1 dermatomal pattern.    Seated slump: (+) on left for increased sciatic neural tension, (-) on right    Supine passive SLR: (+) on the left at 60 degrees, (-) on right    Active Range of Motion     Lumbar   Flexion: 55 degrees   Extension: 15 degrees   Left lateral flexion: Active left lumbar lateral flexion: knee joint line.   Right lateral flexion: Active right lumbar lateral flexion: knee joint line. with pain  Left rotation: Active left lumbar  rotation: 75%   Right rotation: Active right lumbar rotation: 75% with pain    Strength/Myotome Testing     Left Hip   Planes of Motion   Flexion: 4-  Extension: 4-  Abduction: 4-    Right Hip   Planes of Motion   Flexion: 4  Extension: 4  Abduction: 4    Left Knee   Flexion: 4+  Extension: 4+    Right Knee   Flexion: 4+  Extension: 4+    Left Ankle/Foot   Dorsiflexion: 4+    Right Ankle/Foot   Dorsiflexion: 4+    Ambulation     Ambulation: Level Surfaces     Additional Level Surfaces Ambulation Details  The patient ambulates with normal biomechanics of gait.      See Exercise, Manual, and Modality Logs for complete treatment.     Assessment & Plan       Assessment  Assessment details: The patient was re-evaluated today and presents with improvements in low back pain, lower extremity strength, sciatic neural tension (with supine passive SLR) and self-rated function (LARRY) compared to her previous evaluation. Overall, she has progressed well with PT and is capable of self-managing her symptoms at this time. She is appropriate for discharge from PT at this time.    Goals  Plan Goals:  LOW BACK PROBLEMS:     1. The patient complains of low back pain.  LTG 1: 12 weeks:  The patient will report a pain rating of 1/10 or better in order to improve  tolerance to activities of daily living and improve sleep quality.  STATUS:  Met  STG 1a: 6 weeks:  The patient will report a pain rating of 3/10 or better.  STATUS:  Met     2. The patient demonstrates weakness of the left hip.  LTG 2: 12 weeks:  The patient will demonstrate 4+ /5 strength for left hip flexion, abduction,  and extension in order to improve hip stability.  STATUS:  Progressing  STG 2a: 6 weeks:  The patient will demonstrate 4 /5 strength for left hip flexion, abduction,  and extension.  STATUS:  Progressing     3. The patient has increased left sided sciatic neural tension with supine passive SLR test.  LTG 3: 12 weeks:  The patient will demonstrate a reduction  in sciatic neural tension by completing SLR test to 60 degrees of hip flexion or greater.  STATUS:  Met  STG 3a: 6 weeks: The patient will demonstrate a reduction in sciatic neural tension by completing SLR test to 40 degrees of hip flexion or greater.    STATUS:  Met     4. The patient has limited lumbar AROM  LTG 4: 12 weeks:  The patient will demonstrate lumbar AROM as follows: 65 of flexion and 20 degrees of extension.  STATUS:  Progressing  STG 4a: 6 weeks: The patient will demonstrate lumbar AROM as follows: 60 of flexion and 15 degrees of extension.  STATUS:  Progressing     5. Mobility: Walking/Moving Around Functional Limitation                   LTG 5: 12 weeks:  The patient will demonstrate 10 % limitation by achieving a score of 5/50 on the LARRY.  STATUS:  Progressing  STG 5 a: 6 weeks:  The patient will demonstrate 30 % limitation by achieving a score of 15/50 on the LARRY.    STATUS:  Met        Progress toward previous goals: Partially Met      Recommendations: Discharge    Prognosis to achieve goals: good    PT Signature: Elvis Aldana PT    Electronically signed 1/3/2024    [place] License: PT - [id number]       Timed:  Manual Therapy:    0     mins  14911;  Therapeutic Exercise:    24     mins  22887;     Neuromuscular Dilcia:    0    mins  85547;    Therapeutic Activity:     14     mins  41229;     Gait Trainin     mins  03277;     Aquatics                         0      mins  41062    Un-timed:  Mechanical Traction      0     mins  19468  Dry Needling     0     mins self-pay  Electrical Stimulation:    0     mins  39380 ( );    Timed Treatment:   38   mins   Total Treatment:     38   mins

## 2024-06-06 ENCOUNTER — TELEPHONE (OUTPATIENT)
Dept: GASTROENTEROLOGY | Facility: CLINIC | Age: 74
End: 2024-06-06
Payer: MEDICARE

## 2024-06-06 NOTE — TELEPHONE ENCOUNTER
Patient called the office and stated that she received a letter through her Mychart about getting her one year colonoscopy scheduled. I verbalized understanding and verified her information then advise her on the recall screening call process and scheduled her for a screening call close to her recall date. Patient is aware that she will be contacted by the office on 08/01/24 at 9 am.

## 2024-06-07 ENCOUNTER — OFFICE VISIT (OUTPATIENT)
Dept: FAMILY MEDICINE CLINIC | Facility: CLINIC | Age: 74
End: 2024-06-07
Payer: MEDICARE

## 2024-06-07 VITALS
SYSTOLIC BLOOD PRESSURE: 129 MMHG | TEMPERATURE: 97.8 F | HEART RATE: 72 BPM | OXYGEN SATURATION: 98 % | HEIGHT: 67 IN | WEIGHT: 196 LBS | BODY MASS INDEX: 30.76 KG/M2 | DIASTOLIC BLOOD PRESSURE: 70 MMHG

## 2024-06-07 DIAGNOSIS — E03.9 ACQUIRED HYPOTHYROIDISM: ICD-10-CM

## 2024-06-07 DIAGNOSIS — F41.9 ANXIETY: Primary | ICD-10-CM

## 2024-06-07 DIAGNOSIS — I10 PRIMARY HYPERTENSION: ICD-10-CM

## 2024-06-07 DIAGNOSIS — F33.0 MILD EPISODE OF RECURRENT MAJOR DEPRESSIVE DISORDER: ICD-10-CM

## 2024-06-07 DIAGNOSIS — G89.29 CHRONIC LOW BACK PAIN, UNSPECIFIED BACK PAIN LATERALITY, UNSPECIFIED WHETHER SCIATICA PRESENT: ICD-10-CM

## 2024-06-07 DIAGNOSIS — M54.50 CHRONIC LOW BACK PAIN, UNSPECIFIED BACK PAIN LATERALITY, UNSPECIFIED WHETHER SCIATICA PRESENT: ICD-10-CM

## 2024-06-07 LAB
ALBUMIN SERPL-MCNC: 4.4 G/DL (ref 3.5–5.2)
ALBUMIN/GLOB SERPL: 1.8 G/DL
ALP SERPL-CCNC: 59 U/L (ref 39–117)
ALT SERPL W P-5'-P-CCNC: 15 U/L (ref 1–33)
ANION GAP SERPL CALCULATED.3IONS-SCNC: 11.7 MMOL/L (ref 5–15)
AST SERPL-CCNC: 17 U/L (ref 1–32)
BILIRUB SERPL-MCNC: 0.3 MG/DL (ref 0–1.2)
BUN SERPL-MCNC: 9 MG/DL (ref 8–23)
BUN/CREAT SERPL: 12 (ref 7–25)
CALCIUM SPEC-SCNC: 9.8 MG/DL (ref 8.6–10.5)
CHLORIDE SERPL-SCNC: 102 MMOL/L (ref 98–107)
CHOLEST SERPL-MCNC: 182 MG/DL (ref 0–200)
CO2 SERPL-SCNC: 24.3 MMOL/L (ref 22–29)
CREAT SERPL-MCNC: 0.75 MG/DL (ref 0.57–1)
EGFRCR SERPLBLD CKD-EPI 2021: 84.7 ML/MIN/1.73
GLOBULIN UR ELPH-MCNC: 2.5 GM/DL
GLUCOSE SERPL-MCNC: 112 MG/DL (ref 65–99)
HDLC SERPL-MCNC: 46 MG/DL (ref 40–60)
LDLC SERPL CALC-MCNC: 118 MG/DL (ref 0–100)
LDLC/HDLC SERPL: 2.52 {RATIO}
POTASSIUM SERPL-SCNC: 4.4 MMOL/L (ref 3.5–5.2)
PROT SERPL-MCNC: 6.9 G/DL (ref 6–8.5)
SODIUM SERPL-SCNC: 138 MMOL/L (ref 136–145)
TRIGL SERPL-MCNC: 101 MG/DL (ref 0–150)
TSH SERPL DL<=0.05 MIU/L-ACNC: 0.51 UIU/ML (ref 0.27–4.2)
VLDLC SERPL-MCNC: 18 MG/DL (ref 5–40)

## 2024-06-07 PROCEDURE — 3074F SYST BP LT 130 MM HG: CPT | Performed by: FAMILY MEDICINE

## 2024-06-07 PROCEDURE — 80061 LIPID PANEL: CPT | Performed by: FAMILY MEDICINE

## 2024-06-07 PROCEDURE — 3078F DIAST BP <80 MM HG: CPT | Performed by: FAMILY MEDICINE

## 2024-06-07 PROCEDURE — 36415 COLL VENOUS BLD VENIPUNCTURE: CPT | Performed by: FAMILY MEDICINE

## 2024-06-07 PROCEDURE — 99214 OFFICE O/P EST MOD 30 MIN: CPT | Performed by: FAMILY MEDICINE

## 2024-06-07 PROCEDURE — 84443 ASSAY THYROID STIM HORMONE: CPT | Performed by: FAMILY MEDICINE

## 2024-06-07 PROCEDURE — 80053 COMPREHEN METABOLIC PANEL: CPT | Performed by: FAMILY MEDICINE

## 2024-06-07 RX ORDER — DILTIAZEM HYDROCHLORIDE 240 MG/1
240 CAPSULE, COATED, EXTENDED RELEASE ORAL DAILY
Qty: 90 CAPSULE | Refills: 3 | Status: SHIPPED | OUTPATIENT
Start: 2024-06-07

## 2024-06-07 RX ORDER — CYCLOBENZAPRINE HCL 10 MG
10 TABLET ORAL 3 TIMES DAILY PRN
Qty: 60 TABLET | Refills: 5 | Status: SHIPPED | OUTPATIENT
Start: 2024-06-07

## 2024-06-07 RX ORDER — CITALOPRAM 40 MG/1
40 TABLET ORAL DAILY
Qty: 90 TABLET | Refills: 1 | Status: SHIPPED | OUTPATIENT
Start: 2024-06-07

## 2024-06-07 RX ORDER — IRBESARTAN 300 MG/1
300 TABLET ORAL DAILY
Qty: 90 TABLET | Refills: 3 | Status: SHIPPED | OUTPATIENT
Start: 2024-06-07

## 2024-06-07 RX ORDER — LEVOTHYROXINE SODIUM 112 UG/1
112 TABLET ORAL DAILY
Qty: 90 TABLET | Refills: 3 | Status: SHIPPED | OUTPATIENT
Start: 2024-06-07

## 2024-06-07 RX ORDER — INDAPAMIDE 2.5 MG/1
2.5 TABLET ORAL DAILY
Qty: 90 TABLET | Refills: 3 | Status: SHIPPED | OUTPATIENT
Start: 2024-06-07

## 2024-06-07 NOTE — ASSESSMENT & PLAN NOTE
She is doing well mental health wise.  She does continue to use the lorazepam daily.  Encouraged her to try and cut back and use that more on an as-needed basis.

## 2024-06-07 NOTE — PROGRESS NOTES
Chief Complaint   Patient presents with    Follow-up     6 month  requesting refill on lorazepam     Anxiety        Subjective     Shalonda Hart  has a past medical history of Anxiety, Arthritis, Colon polyp, Deep vein thrombosis, Depression (09/03/2015), Diverticulitis, Esophageal dysphagia, Fatty liver, Gastroesophageal reflux disease, History of bladder surgery (03/29/2021), History of cataract extraction (03/29/2021), History of cystocele (08/25/2020), History of DVT (deep vein thrombosis) (01/08/2019), History of gastric polyp (03/29/2021), History of repair of rectocele (08/25/2020), Hypothyroid (07/05/2018), Irritable bowel syndrome (IBS), Long-term use of high-risk medication (09/03/2015), Low back pain (06/02/2014), Medication management (07/05/2018), Sinus congestion, Skin lesion (09/03/2015), Sleep apnea, and Status post gastric bypass for obesity (03/21/2015).    Hypertension-she does not check her blood pressure outside the office.  It is good here today at 129/70.    Hypothyroid-she takes her levothyroxine every morning as prescribed.    Depression/anxiety-she states that her depression and anxiety is doing well.  Currently her use of the lorazepam is about 1 a day.  She does it later in her day.        PHQ-2 Depression Screening  Little interest or pleasure in doing things?     Feeling down, depressed, or hopeless?     PHQ-2 Total Score     PHQ-9 Depression Screening  Little interest or pleasure in doing things?     Feeling down, depressed, or hopeless?     Trouble falling or staying asleep, or sleeping too much?     Feeling tired or having little energy?     Poor appetite or overeating?     Feeling bad about yourself - or that you are a failure or have let yourself or your family down?     Trouble concentrating on things, such as reading the newspaper or watching television?     Moving or speaking so slowly that other people could have noticed? Or the opposite - being so fidgety or restless that  you have been moving around a lot more than usual?     Thoughts that you would be better off dead, or of hurting yourself in some way?     PHQ-9 Total Score     If you checked off any problems, how difficult have these problems made it for you to do your work, take care of things at home, or get along with other people?       Allergies   Allergen Reactions    Hydrochlorothiazide Swelling     ORAL SWELLING/SORES    Sulfa Antibiotics Rash     SWELLING/RASH    Tape Other (See Comments)     Pt gets blisters with plastic tape.    Lidocaine Swelling and Rash     Can not use TOPICAL LIDOCAINE. Does ok with the injectable lidocaine.         Prior to Admission medications    Medication Sig Start Date End Date Taking? Authorizing Provider   Calcium Citrate-Vitamin D 500-500 MG-UNIT chewable tablet Chew.   Yes Meeta Ayala MD   carboxymethylcellulose (REFRESH PLUS) 0.5 % solution 3 (Three) Times a Day As Needed.   Yes Meeta Ayala MD   Cholecalciferol 50 MCG (2000 UT) tablet Take  by mouth Daily.   Yes Meeta Ayala MD   citalopram (CeleXA) 40 MG tablet Take 1 tablet by mouth Daily. 12/7/23  Yes Juan Antonio Gagnon, DO   cyclobenzaprine (FLEXERIL) 10 MG tablet Take 1 tablet by mouth 3 (Three) Times a Day As Needed for Muscle Spasms. 12/7/23  Yes Juan Antonio Gagnon DO   dilTIAZem CD (CARDIZEM CD) 240 MG 24 hr capsule Take 1 capsule by mouth Daily. 12/7/23  Yes Juan Antonio Gagnon DO   docusate calcium (SURFAK) 240 MG capsule Take 1 capsule by mouth 2 (Two) Times a Day.   Yes Meeta Ayala MD   fexofenadine (ALLEGRA) 180 MG tablet Take 1 tablet by mouth Daily As Needed.   Yes Meeta Ayala MD   indapamide (LOZOL) 2.5 MG tablet Take 1 tablet by mouth Daily. 12/7/23  Yes Juan Antonio Gagnon DO   irbesartan (AVAPRO) 300 MG tablet Take 1 tablet by mouth Daily. 12/7/23  Yes Juan Antonio Gagnon DO   levothyroxine (SYNTHROID, LEVOTHROID) 112 MCG tablet Take 1 tablet by  mouth Daily. 23  Yes Juan Antonio Gagnon, DO   LORazepam (ATIVAN) 1 MG tablet Take 1-2 tablets by mouth 2 (Two) Times a Day As Needed for Anxiety. 23  Yes Juan Antonio Gagnon DO   multivitamins-minerals (PRESERVISION AREDS 2) capsule capsule Take 1 capsule by mouth 2 (Two) Times a Day.   Yes Meeta Ayala MD   Psyllium (METAMUCIL FIBER PO) Take  by mouth At Night As Needed.   Yes Meeta Ayala MD   saccharomyces boulardii (FLORASTOR) 250 MG capsule Take 1 capsule by mouth. Only when on antibiotic   Yes Emergency, Nurse Epic, RN   Simethicone (GAS-X PO) Take  by mouth Every 4 (Four) Hours As Needed.   Yes Meeta Ayala MD   vitamin B-12 (CYANOCOBALAMIN) 1000 MCG tablet Take 1 tablet by mouth Daily.   Yes Meeta Ayala MD   multivitamin-iron-minerals-folic acid (CENTRUM) chewable tablet Chew 1 tablet Daily.    Meeta Ayala MD   pantoprazole (PROTONIX) 20 MG EC tablet Take 1 tablet by mouth Daily.  Patient not taking: Reported on 2024   Juan Antonio Gagnon, DO   HYDROcodone-acetaminophen (NORCO) 5-325 MG per tablet Take 1 tablet by mouth.  21  Meeta Ayala MD        Patient Active Problem List   Diagnosis    Medication management    Low back pain    Hypothyroid    Hypertension    Gastroesophageal reflux disease    Fatty liver    Esophageal dysphagia    Depression    Anxiety    Arthritis    Hx of bariatric surgery    History of suburethral sling procedure    Midline cystocele    Morbid (severe) obesity due to excess calories    Vaginal vault prolapse    Rectocele    Polyp of colon    Acute left-sided low back pain with left-sided sciatica        Past Surgical History:   Procedure Laterality Date    APPENDECTOMY  1969    BARIATRIC SURGERY      Gastric Sleeve     SECTION      CHOLECYSTECTOMY      COLONOSCOPY  2017    COLONOSCOPY N/A 2023    Procedure: COLONOSCOPY FOR SCREENING with polypectomy with cold  snare;  Surgeon: Destini Randolph MD;  Location: [facility] ENDOSCOPY;  Service: Gastroenterology;  Laterality: N/A;  colon polyp, diverticulosis    CYSTOCELE REPAIR  07/15/2020    GASTRIC SLEEVE LAPAROSCOPIC  2019    HYSTERECTOMY  1984    RECTOCELE REPAIR  07/15/2020    SHOULDER SURGERY Left 2001    TONSILLECTOMY  1968    UPPER GASTROINTESTINAL ENDOSCOPY      VAGINAL REPAIR  07/15/2020    vaginal wall lift       Social History     Socioeconomic History    Marital status:    Tobacco Use    Smoking status: Never    Smokeless tobacco: Never   Vaping Use    Vaping status: Never Used   Substance and Sexual Activity    Alcohol use: Yes     Comment: rarely    Drug use: Yes     Types: Benzodiazepines     Comment: lorazepam    Sexual activity: Yes     Partners: Male     Birth control/protection: Hysterectomy     Comment: Spouse only       Family History   Problem Relation Age of Onset    Heart disease Mother     Diabetes Mother     Depression Mother     Hypertension Mother     Hypertension Father     Diabetes Sister     Depression Sister     Hypertension Sister     Heart disease Sister     Kidney disease Sister     Hypertension Brother     Heart disease Brother     Diabetes Brother     Kidney disease Brother     Diabetes Sister     Heart disease Sister     Hypertension Sister     Malig Hyperthermia Neg Hx        Family history, surgical history, past medical history, Allergies and meds reviewed with patient today and updated in [employer] EMR.     ROS:  Review of Systems   Constitutional:  Negative for fatigue.   HENT:  Negative for congestion, postnasal drip and rhinorrhea.    Eyes:  Negative for blurred vision and visual disturbance.   Respiratory:  Negative for cough, chest tightness, shortness of breath and wheezing.    Cardiovascular:  Negative for chest pain and palpitations.   Allergic/Immunologic: Negative for environmental allergies.   Neurological:  Negative for headache.   Psychiatric/Behavioral:  Negative  "for depressed mood. The patient is not nervous/anxious.        OBJECTIVE:  Vitals:    06/07/24 0748   BP: 129/70   BP Location: Left arm   Patient Position: Sitting   Pulse: 72   Temp: 97.8 °F (36.6 °C)   SpO2: 98%   Weight: 88.9 kg (196 lb)   Height: 170.2 cm (67\")     No results found.   Body mass index is 30.7 kg/m².  No LMP recorded. Patient has had a hysterectomy.    The 10-year ASCVD risk score (Magui VARGHESE, et al., 2019) is: 16%    Values used to calculate the score:      Age: 73 years      Sex: Female      Is Non- : No      Diabetic: No      Tobacco smoker: No      Systolic Blood Pressure: 129 mmHg      Is BP treated: Yes      HDL Cholesterol: 44 mg/dL      Total Cholesterol: 195 mg/dL     Physical Exam  Vitals and nursing note reviewed.   Constitutional:       General: She is not in acute distress.     Appearance: Normal appearance. She is normal weight.   HENT:      Head: Normocephalic.      Right Ear: Tympanic membrane, ear canal and external ear normal.      Left Ear: Tympanic membrane, ear canal and external ear normal.      Nose: Nose normal.      Mouth/Throat:      Mouth: Mucous membranes are moist.      Pharynx: Oropharynx is clear.   Eyes:      General: No scleral icterus.     Conjunctiva/sclera: Conjunctivae normal.      Pupils: Pupils are equal, round, and reactive to light.   Cardiovascular:      Rate and Rhythm: Normal rate and regular rhythm.      Pulses: Normal pulses.      Heart sounds: Normal heart sounds. No murmur heard.  Pulmonary:      Effort: Pulmonary effort is normal.      Breath sounds: Normal breath sounds. No wheezing, rhonchi or rales.   Musculoskeletal:      Cervical back: Neck supple. No rigidity or tenderness.   Lymphadenopathy:      Cervical: No cervical adenopathy.   Skin:     General: Skin is warm and dry.      Coloration: Skin is not jaundiced.      Findings: No rash.   Neurological:      General: No focal deficit present.      Mental Status: She is " alert and oriented to person, place, and time.   Psychiatric:         Mood and Affect: Mood normal.         Thought Content: Thought content normal.         Judgment: Judgment normal.         Procedures    No visits with results within 30 Day(s) from this visit.   Latest known visit with results is:   Office Visit on 12/07/2023   Component Date Value Ref Range Status    Glucose 12/07/2023 114 (H)  65 - 99 mg/dL Final    BUN 12/07/2023 8  8 - 23 mg/dL Final    Creatinine 12/07/2023 0.83  0.57 - 1.00 mg/dL Final    Sodium 12/07/2023 136  136 - 145 mmol/L Final    Potassium 12/07/2023 4.2  3.5 - 5.2 mmol/L Final    Chloride 12/07/2023 101  98 - 107 mmol/L Final    CO2 12/07/2023 24.6  22.0 - 29.0 mmol/L Final    Calcium 12/07/2023 10.3  8.6 - 10.5 mg/dL Final    Total Protein 12/07/2023 6.9  6.0 - 8.5 g/dL Final    Albumin 12/07/2023 4.6  3.5 - 5.2 g/dL Final    ALT (SGPT) 12/07/2023 17  1 - 33 U/L Final    AST (SGOT) 12/07/2023 18  1 - 32 U/L Final    Alkaline Phosphatase 12/07/2023 57  39 - 117 U/L Final    Total Bilirubin 12/07/2023 0.3  0.0 - 1.2 mg/dL Final    Globulin 12/07/2023 2.3  gm/dL Final    A/G Ratio 12/07/2023 2.0  g/dL Final    BUN/Creatinine Ratio 12/07/2023 9.6  7.0 - 25.0 Final    Anion Gap 12/07/2023 10.4  5.0 - 15.0 mmol/L Final    eGFR 12/07/2023 75.0  >60.0 mL/min/1.73 Final    Total Cholesterol 12/07/2023 195  0 - 200 mg/dL Final    Triglycerides 12/07/2023 104  0 - 150 mg/dL Final    HDL Cholesterol 12/07/2023 44  40 - 60 mg/dL Final    LDL Cholesterol  12/07/2023 132 (H)  0 - 100 mg/dL Final    VLDL Cholesterol 12/07/2023 19  5 - 40 mg/dL Final    LDL/HDL Ratio 12/07/2023 2.96   Final    TSH 12/07/2023 4.990 (H)  0.270 - 4.200 uIU/mL Final    Free T4 12/07/2023 0.94  0.93 - 1.70 ng/dL Final    T4 results may be falsely increased if patient taking Biotin.    WBC 12/07/2023 6.43  3.40 - 10.80 10*3/mm3 Final    RBC 12/07/2023 3.98  3.77 - 5.28 10*6/mm3 Final    Hemoglobin 12/07/2023 12.7  12.0 -  15.9 g/dL Final    Hematocrit 12/07/2023 36.9  34.0 - 46.6 % Final    MCV 12/07/2023 92.7  79.0 - 97.0 fL Final    MCH 12/07/2023 31.9  26.6 - 33.0 pg Final    MCHC 12/07/2023 34.4  31.5 - 35.7 g/dL Final    RDW 12/07/2023 13.1  12.3 - 15.4 % Final    RDW-SD 12/07/2023 43.9  37.0 - 54.0 fl Final    MPV 12/07/2023 10.6  6.0 - 12.0 fL Final    Platelets 12/07/2023 271  140 - 450 10*3/mm3 Final       ASSESSMENT/ PLAN:    Diagnoses and all orders for this visit:    1. Anxiety (Primary)    2. Mild episode of recurrent major depressive disorder  Assessment & Plan:  She is doing well mental health wise.  She does continue to use the lorazepam daily.  Encouraged her to try and cut back and use that more on an as-needed basis.    Orders:  -     citalopram (CeleXA) 40 MG tablet; Take 1 tablet by mouth Daily.  Dispense: 90 tablet; Refill: 1    3. Chronic low back pain, unspecified back pain laterality, unspecified whether sciatica present  -     cyclobenzaprine (FLEXERIL) 10 MG tablet; Take 1 tablet by mouth 3 (Three) Times a Day As Needed for Muscle Spasms.  Dispense: 60 tablet; Refill: 5    4. Primary hypertension  Assessment & Plan:  Her blood pressure is great here today.  Will continue her current meds and update her labs    Orders:  -     dilTIAZem CD (CARDIZEM CD) 240 MG 24 hr capsule; Take 1 capsule by mouth Daily.  Dispense: 90 capsule; Refill: 3  -     indapamide (LOZOL) 2.5 MG tablet; Take 1 tablet by mouth Daily.  Dispense: 90 tablet; Refill: 3  -     irbesartan (AVAPRO) 300 MG tablet; Take 1 tablet by mouth Daily.  Dispense: 90 tablet; Refill: 3  -     Comprehensive Metabolic Panel  -     Lipid Panel    5. Acquired hypothyroidism  Assessment & Plan:  Will update her thyroid profile with her routine labs here today.    Orders:  -     levothyroxine (SYNTHROID, LEVOTHROID) 112 MCG tablet; Take 1 tablet by mouth Daily.  Dispense: 90 tablet; Refill: 3  -     TSH Rfx On Abnormal To Free T4               Orders Placed  Today:     New Medications Ordered This Visit   Medications    citalopram (CeleXA) 40 MG tablet     Sig: Take 1 tablet by mouth Daily.     Dispense:  90 tablet     Refill:  1    cyclobenzaprine (FLEXERIL) 10 MG tablet     Sig: Take 1 tablet by mouth 3 (Three) Times a Day As Needed for Muscle Spasms.     Dispense:  60 tablet     Refill:  5    dilTIAZem CD (CARDIZEM CD) 240 MG 24 hr capsule     Sig: Take 1 capsule by mouth Daily.     Dispense:  90 capsule     Refill:  3    indapamide (LOZOL) 2.5 MG tablet     Sig: Take 1 tablet by mouth Daily.     Dispense:  90 tablet     Refill:  3    irbesartan (AVAPRO) 300 MG tablet     Sig: Take 1 tablet by mouth Daily.     Dispense:  90 tablet     Refill:  3    levothyroxine (SYNTHROID, LEVOTHROID) 112 MCG tablet     Sig: Take 1 tablet by mouth Daily.     Dispense:  90 tablet     Refill:  3        Management Plan:     An After Visit Summary was printed and given to the patient at discharge.    Follow-up: Return in about 6 months (around 12/7/2024) for Recheck.    Juan Antonio Gagnon DO 6/7/2024 08:24 EDT  This note was electronically signed.  Answers submitted by the patient for this visit:  Office Visit on 6/7/2024  8:00 AM with Juan Antonio Maldonado (Submitted on 6/6/2024)  Please describe your symptoms.: Renew medications  Have you had these symptoms before?: Yes  How long have you been having these symptoms?: Greater than 2 weeks

## 2024-06-22 DIAGNOSIS — F41.9 ANXIETY: ICD-10-CM

## 2024-06-24 RX ORDER — LORAZEPAM 1 MG/1
TABLET ORAL
Qty: 30 TABLET | Refills: 1 | Status: SHIPPED | OUTPATIENT
Start: 2024-06-24

## 2024-08-01 ENCOUNTER — TELEPHONE (OUTPATIENT)
Dept: GASTROENTEROLOGY | Facility: CLINIC | Age: 74
End: 2024-08-01
Payer: MEDICARE

## 2024-08-02 NOTE — TELEPHONE ENCOUNTER
Patient contacted the office at 109 pm stating that she was returning Lucy's call about a phone appointment. Patient stated that she had missed placed her phone and she just found it and was returning our call. Home number [phone] and cellphone [phone].

## 2024-08-08 ENCOUNTER — CLINICAL SUPPORT (OUTPATIENT)
Dept: GASTROENTEROLOGY | Facility: CLINIC | Age: 74
End: 2024-08-08
Payer: MEDICARE

## 2024-08-08 ENCOUNTER — PREP FOR SURGERY (OUTPATIENT)
Dept: OTHER | Facility: HOSPITAL | Age: 74
End: 2024-08-08
Payer: MEDICARE

## 2024-08-08 DIAGNOSIS — Z86.010 HISTORY OF COLON POLYPS: Primary | ICD-10-CM

## 2024-08-08 DIAGNOSIS — Z12.11 SCREENING FOR MALIGNANT NEOPLASM OF COLON: ICD-10-CM

## 2024-08-08 RX ORDER — ESTRADIOL 0.1 MG/G
CREAM VAGINAL
COMMUNITY
Start: 2024-06-21

## 2024-08-08 NOTE — PROGRESS NOTES
Shalonda Grullon Tanner  1950  73 y.o.    Reason for call: 1 year recall  Prep prescribed: Suzie  Prep instructions reviewed with patient and sent to patient via regular mail to the home address on file  Is the patient currently on any injectable or oral medications for weight loss or diabetes? No  Clearance needed? No  If yes, what clearance is needed? N/A  Clearance has been requested from N/A  The patient has been scheduled for: Colonoscopy  After your procedure, you will be contacted with results. Please confirm the best phone # to reach the patient: [phone]  Family history of colon cancer? No  If yes, indicate relative: N/A  Tentative Procedure Date: 10/02/2024    Family History   Problem Relation Age of Onset    Heart disease Mother     Diabetes Mother     Depression Mother     Hypertension Mother     Hypertension Father     Diabetes Sister     Depression Sister     Hypertension Sister     Heart disease Sister     Kidney disease Sister     Hypertension Brother     Heart disease Brother     Diabetes Brother     Kidney disease Brother     Diabetes Sister     Heart disease Sister     Hypertension Sister     Malig Hyperthermia Neg Hx      Past Medical History:   Diagnosis Date    Anxiety     Arthritis     Colon polyp     Deep vein thrombosis     After surgery for meniscus.  Right knee    Depression 09/03/2015    Diverticulitis     Esophageal dysphagia     Fatty liver     Gastroesophageal reflux disease     History of bladder surgery 03/29/2021    History of cataract extraction 03/29/2021    History of cystocele 08/25/2020    repaired 7/2020    History of DVT (deep vein thrombosis) 01/08/2019    History of gastric polyp 03/29/2021    History of repair of rectocele 08/25/2020    repaired 7/2020    Hypothyroid 07/05/2018    Irritable bowel syndrome (IBS)     Long-term use of high-risk medication 09/03/2015    Low back pain 06/02/2014    Medication management 07/05/2018    Sinus congestion     allergies    Skin  "lesion 2015    Sleep apnea     \"long ago before weight loss\"    Status post gastric bypass for obesity 2015     Allergies   Allergen Reactions    Hydrochlorothiazide Swelling     ORAL SWELLING/SORES    Sulfa Antibiotics Rash     SWELLING/RASH    Tape Other (See Comments)     Pt gets blisters with plastic tape.    Lidocaine Swelling and Rash     Can not use TOPICAL LIDOCAINE. Does ok with the injectable lidocaine.       Past Surgical History:   Procedure Laterality Date    APPENDECTOMY      BARIATRIC SURGERY      Gastric Sleeve     SECTION      CHOLECYSTECTOMY      COLONOSCOPY      COLONOSCOPY N/A 2023    Procedure: COLONOSCOPY FOR SCREENING with polypectomy with cold snare;  Surgeon: Destini Randolph MD;  Location: [facility] ENDOSCOPY;  Service: Gastroenterology;  Laterality: N/A;  colon polyp, diverticulosis    CYSTOCELE REPAIR  07/15/2020    GASTRIC SLEEVE LAPAROSCOPIC  2019    HYSTERECTOMY  1984    RECTOCELE REPAIR  07/15/2020    SHOULDER SURGERY Left     TONSILLECTOMY  1968    UPPER GASTROINTESTINAL ENDOSCOPY      VAGINAL REPAIR  07/15/2020    vaginal wall lift     Social History     Socioeconomic History    Marital status:    Tobacco Use    Smoking status: Never    Smokeless tobacco: Never   Vaping Use    Vaping status: Never Used   Substance and Sexual Activity    Alcohol use: Yes     Comment: rarely    Drug use: Yes     Types: Benzodiazepines     Comment: lorazepam    Sexual activity: Yes     Partners: Male     Birth control/protection: Hysterectomy     Comment: Spouse only       Current Outpatient Medications:     Calcium Citrate-Vitamin D 500-500 MG-UNIT chewable tablet, Chew., Disp: , Rfl:     carboxymethylcellulose (REFRESH PLUS) 0.5 % solution, 3 (Three) Times a Day As Needed., Disp: , Rfl:     Cholecalciferol 50 MCG (2000 UT) tablet, Take  by mouth Daily., Disp: , Rfl:     citalopram (CeleXA) 40 MG tablet, Take 1 tablet by mouth Daily., Disp: " 90 tablet, Rfl: 1    cyclobenzaprine (FLEXERIL) 10 MG tablet, Take 1 tablet by mouth 3 (Three) Times a Day As Needed for Muscle Spasms., Disp: 60 tablet, Rfl: 5    dilTIAZem CD (CARDIZEM CD) 240 MG 24 hr capsule, Take 1 capsule by mouth Daily., Disp: 90 capsule, Rfl: 3    docusate calcium (SURFAK) 240 MG capsule, Take 1 capsule by mouth 2 (Two) Times a Day., Disp: , Rfl:     estradiol (ESTRACE) 0.1 MG/GM vaginal cream, Pea size amount to urethra 2-3x/week., Disp: , Rfl:     fexofenadine (ALLEGRA) 180 MG tablet, Take 1 tablet by mouth Daily As Needed., Disp: , Rfl:     indapamide (LOZOL) 2.5 MG tablet, Take 1 tablet by mouth Daily., Disp: 90 tablet, Rfl: 3    irbesartan (AVAPRO) 300 MG tablet, Take 1 tablet by mouth Daily., Disp: 90 tablet, Rfl: 3    levothyroxine (SYNTHROID, LEVOTHROID) 112 MCG tablet, Take 1 tablet by mouth Daily., Disp: 90 tablet, Rfl: 3    LORazepam (ATIVAN) 1 MG tablet, TAKE ONE TO TWO TABLETS BY MOUTH TWICE A DAY AS NEEDED FOR ANXIETY, Disp: 30 tablet, Rfl: 1    multivitamins-minerals (PRESERVISION AREDS 2) capsule capsule, Take 1 capsule by mouth 2 (Two) Times a Day., Disp: , Rfl:     Psyllium (METAMUCIL FIBER PO), Take  by mouth At Night As Needed., Disp: , Rfl:     saccharomyces boulardii (FLORASTOR) 250 MG capsule, Take 1 capsule by mouth. Only when on antibiotic, Disp: , Rfl:     Simethicone (GAS-X PO), Take  by mouth Every 4 (Four) Hours As Needed., Disp: , Rfl:     vitamin B-12 (CYANOCOBALAMIN) 1000 MCG tablet, Take 1 tablet by mouth Daily., Disp: , Rfl:

## 2024-08-09 PROBLEM — Z12.11 SCREENING FOR MALIGNANT NEOPLASM OF COLON: Status: ACTIVE | Noted: 2024-08-08

## 2024-08-09 PROBLEM — Z86.0100 HISTORY OF COLON POLYPS: Status: ACTIVE | Noted: 2024-08-08

## 2024-08-09 PROBLEM — Z86.010 HISTORY OF COLON POLYPS: Status: ACTIVE | Noted: 2024-08-08

## 2024-09-23 ENCOUNTER — TELEPHONE (OUTPATIENT)
Dept: GASTROENTEROLOGY | Facility: CLINIC | Age: 74
End: 2024-09-23
Payer: MEDICARE

## 2024-11-21 NOTE — PRE-PROCEDURE INSTRUCTIONS
"PAT call attempted. No answer.Detailed message with date and arrival time of 0700 given. Come to entrance \"C\", must have adult  for transportation home;may have two visitors;however, children under 12 must remain in waiting area.Instructed on diet/clear liquids/NPO bowel prep,if needed may take normal meds two hours prior to arrival time except for blood thinners, antidiabetics,diurectics,and weight loss meds.Instructed to return call to confirm receipt of instructions and for any questions.  "

## 2024-11-22 ENCOUNTER — TELEPHONE (OUTPATIENT)
Dept: GASTROENTEROLOGY | Facility: CLINIC | Age: 74
End: 2024-11-22
Payer: MEDICARE

## 2024-11-22 NOTE — TELEPHONE ENCOUNTER
Shalonda Grullon Tanner  1950    Patient requested to Reschedule their Colonoscopy. I have offered to reschedule this patient and patient has agreed. Patient has been rescheduled to 02/24/2025.    Reason for cancelling/rescheduling: patient is out of town for family issues    This procedure was ordered by Destini Randolph MD for an important reason. We want to inform you that there are risks associated with not proceeding with the procedure at this time such as a delay in diagnosis, risk of incurable disease, or cancer.    Patient plans to call us back to reschedule: No its taken care of today.    Updated clearance needed?: No    If yes, clearance request has been submitted to: N/A    Is the patient currently on any injectable medications for weight loss or diabetes? No    If yes, are they aware that they will need to discontinue this 7 days prior to their procedure? N/A    Patient verbalized understanding for all of the above information.

## 2024-12-09 ENCOUNTER — LAB (OUTPATIENT)
Dept: LAB | Facility: HOSPITAL | Age: 74
End: 2024-12-09
Payer: MEDICARE

## 2024-12-09 ENCOUNTER — OFFICE VISIT (OUTPATIENT)
Dept: FAMILY MEDICINE CLINIC | Facility: CLINIC | Age: 74
End: 2024-12-09
Payer: MEDICARE

## 2024-12-09 VITALS
WEIGHT: 195 LBS | DIASTOLIC BLOOD PRESSURE: 70 MMHG | SYSTOLIC BLOOD PRESSURE: 127 MMHG | OXYGEN SATURATION: 96 % | HEART RATE: 66 BPM | HEIGHT: 67 IN | TEMPERATURE: 97 F | BODY MASS INDEX: 30.61 KG/M2

## 2024-12-09 DIAGNOSIS — E03.9 ACQUIRED HYPOTHYROIDISM: ICD-10-CM

## 2024-12-09 DIAGNOSIS — I10 PRIMARY HYPERTENSION: ICD-10-CM

## 2024-12-09 DIAGNOSIS — F41.9 ANXIETY: Primary | ICD-10-CM

## 2024-12-09 LAB
ALBUMIN SERPL-MCNC: 3.9 G/DL (ref 3.5–5.2)
ALBUMIN/GLOB SERPL: 1.3 G/DL
ALP SERPL-CCNC: 67 U/L (ref 39–117)
ALT SERPL W P-5'-P-CCNC: 15 U/L (ref 1–33)
ANION GAP SERPL CALCULATED.3IONS-SCNC: 10.6 MMOL/L (ref 5–15)
AST SERPL-CCNC: 18 U/L (ref 1–32)
BILIRUB SERPL-MCNC: 0.2 MG/DL (ref 0–1.2)
BUN SERPL-MCNC: 7 MG/DL (ref 8–23)
BUN/CREAT SERPL: 8.1 (ref 7–25)
CALCIUM SPEC-SCNC: 9.7 MG/DL (ref 8.6–10.5)
CHLORIDE SERPL-SCNC: 104 MMOL/L (ref 98–107)
CHOLEST SERPL-MCNC: 181 MG/DL (ref 0–200)
CO2 SERPL-SCNC: 26.4 MMOL/L (ref 22–29)
CREAT SERPL-MCNC: 0.86 MG/DL (ref 0.57–1)
EGFRCR SERPLBLD CKD-EPI 2021: 71.4 ML/MIN/1.73
GLOBULIN UR ELPH-MCNC: 3.1 GM/DL
GLUCOSE SERPL-MCNC: 108 MG/DL (ref 65–99)
HDLC SERPL-MCNC: 51 MG/DL (ref 40–60)
LDLC SERPL CALC-MCNC: 113 MG/DL (ref 0–100)
LDLC/HDLC SERPL: 2.18 {RATIO}
POTASSIUM SERPL-SCNC: 4.2 MMOL/L (ref 3.5–5.2)
PROT SERPL-MCNC: 7 G/DL (ref 6–8.5)
SODIUM SERPL-SCNC: 141 MMOL/L (ref 136–145)
TRIGL SERPL-MCNC: 94 MG/DL (ref 0–150)
TSH SERPL DL<=0.05 MIU/L-ACNC: 2.75 UIU/ML (ref 0.27–4.2)
VLDLC SERPL-MCNC: 17 MG/DL (ref 5–40)

## 2024-12-09 PROCEDURE — 80061 LIPID PANEL: CPT | Performed by: FAMILY MEDICINE

## 2024-12-09 PROCEDURE — 3078F DIAST BP <80 MM HG: CPT | Performed by: FAMILY MEDICINE

## 2024-12-09 PROCEDURE — 3074F SYST BP LT 130 MM HG: CPT | Performed by: FAMILY MEDICINE

## 2024-12-09 PROCEDURE — 84443 ASSAY THYROID STIM HORMONE: CPT | Performed by: FAMILY MEDICINE

## 2024-12-09 PROCEDURE — 80053 COMPREHEN METABOLIC PANEL: CPT | Performed by: FAMILY MEDICINE

## 2024-12-09 PROCEDURE — 99214 OFFICE O/P EST MOD 30 MIN: CPT | Performed by: FAMILY MEDICINE

## 2024-12-09 RX ORDER — LORAZEPAM 1 MG/1
1 TABLET ORAL EVERY 8 HOURS PRN
Qty: 60 TABLET | Refills: 3 | Status: SHIPPED | OUTPATIENT
Start: 2024-12-09

## 2024-12-09 NOTE — PROGRESS NOTES
Chief Complaint   Patient presents with    Follow-up     6 month     Anxiety        Subjective     Shalonda Hart  has a past medical history of Anxiety, Arthritis, Colon polyp, Deep vein thrombosis, Depression (09/03/2015), Diverticulitis, Esophageal dysphagia, Fatty liver, Gastroesophageal reflux disease, History of bladder surgery (03/29/2021), History of cataract extraction (03/29/2021), History of cystocele (08/25/2020), History of DVT (deep vein thrombosis) (01/08/2019), History of gastric polyp (03/29/2021), History of repair of rectocele (08/25/2020), Hypothyroid (07/05/2018), Irritable bowel syndrome (IBS), Long-term use of high-risk medication (09/03/2015), Low back pain (06/02/2014), Medication management (07/05/2018), Skin lesion (09/03/2015), Sleep apnea, and Status post gastric bypass for obesity (03/21/2015).    Hypertension-she does check her blood pressure at home.  Her blood pressure has been equally as good at home as it is here today.    Hypothyroid-she is taking her levothyroxine every morning.    Anxiety  Presents for follow-up visit.  Symptoms include excessive worry and nervous/anxious behavior.  Patient reports no chest pain, depressed mood, palpitations or shortness of breath. Symptoms occur most days. The severity of symptoms is moderate. The quality of sleep is poor. Past treatments include SSRIs and benzodiazephines. The treatment provided moderate relief. Compliance with medications is %.   Additional comments: She is taking her citalopram daily.  In the last couple months with difficulties in her daughter's life and her daughters divorce she has been using her lorazepam more frequently.      PHQ-2 Depression Screening  Little interest or pleasure in doing things?     Feeling down, depressed, or hopeless?     PHQ-2 Total Score     PHQ-9 Depression Screening  Little interest or pleasure in doing things?     Feeling down, depressed, or hopeless?     Trouble falling or staying  asleep, or sleeping too much?     Feeling tired or having little energy?     Poor appetite or overeating?     Feeling bad about yourself - or that you are a failure or have let yourself or your family down?     Trouble concentrating on things, such as reading the newspaper or watching television?     Moving or speaking so slowly that other people could have noticed? Or the opposite - being so fidgety or restless that you have been moving around a lot more than usual?     Thoughts that you would be better off dead, or of hurting yourself in some way?     PHQ-9 Total Score     If you checked off any problems, how difficult have these problems made it for you to do your work, take care of things at home, or get along with other people?       Allergies   Allergen Reactions    Hydrochlorothiazide Swelling     ORAL SWELLING/SORES    Sulfa Antibiotics Rash     SWELLING/RASH    Tape Other (See Comments)     Pt gets blisters with plastic tape.    Lidocaine Swelling and Rash     Can not use TOPICAL LIDOCAINE. Does ok with the injectable lidocaine.         Prior to Admission medications    Medication Sig Start Date End Date Taking? Authorizing Provider   Calcium Citrate-Vitamin D 500-500 MG-UNIT chewable tablet Chew.   Yes Meeta Ayala MD   carboxymethylcellulose (REFRESH PLUS) 0.5 % solution 3 (Three) Times a Day As Needed.   Yes Meeta Ayala MD   Cholecalciferol 50 MCG (2000 UT) tablet Take  by mouth Daily.   Yes Meeta Ayala MD   citalopram (CeleXA) 40 MG tablet Take 1 tablet by mouth Daily. 6/7/24  Yes Juan Antonio Gagnon, DO   cyclobenzaprine (FLEXERIL) 10 MG tablet Take 1 tablet by mouth 3 (Three) Times a Day As Needed for Muscle Spasms. 6/7/24  Yes Juan Antonio Gagnon, DO   dilTIAZem CD (CARDIZEM CD) 240 MG 24 hr capsule Take 1 capsule by mouth Daily. 6/7/24  Yes Juan Antonio Gagnon, DO   docusate calcium (SURFAK) 240 MG capsule Take 1 capsule by mouth 2 (Two) Times a Day.   Yes  ProviderMeeta MD   estradiol (ESTRACE) 0.1 MG/GM vaginal cream Pea size amount to urethra 2-3x/week. 6/21/24  Yes Meeta Ayala MD   fexofenadine (ALLEGRA) 180 MG tablet Take 1 tablet by mouth Daily As Needed.   Yes Meeta Ayala MD   indapamide (LOZOL) 2.5 MG tablet Take 1 tablet by mouth Daily. 6/7/24  Yes Juan Antonio Gagnon DO   irbesartan (AVAPRO) 300 MG tablet Take 1 tablet by mouth Daily. 6/7/24  Yes Juan Antonio Gagnon, DO   levothyroxine (SYNTHROID, LEVOTHROID) 112 MCG tablet Take 1 tablet by mouth Daily. 6/7/24  Yes Juan Antonio Gagnon DO   LORazepam (ATIVAN) 1 MG tablet TAKE ONE TO TWO TABLETS BY MOUTH TWICE A DAY AS NEEDED FOR ANXIETY 6/24/24  Yes Juan Antonio Gagnon DO   multivitamins-minerals (PRESERVISION AREDS 2) capsule capsule Take 1 capsule by mouth 2 (Two) Times a Day.   Yes Meeta Ayala MD   Psyllium (METAMUCIL FIBER PO) Take  by mouth At Night As Needed.   Yes Meeta Ayala MD   saccharomyces boulardii (FLORASTOR) 250 MG capsule Take 1 capsule by mouth. Only when on antibiotic   Yes Emergency, Nurse Jessenia, RN   Simethicone (GAS-X PO) Take  by mouth Every 4 (Four) Hours As Needed.   Yes Meeta Ayala MD   vitamin B-12 (CYANOCOBALAMIN) 1000 MCG tablet Take 1 tablet by mouth Daily.   Yes Meeta Ayala MD   HYDROcodone-acetaminophen (NORCO) 5-325 MG per tablet Take 1 tablet by mouth.  12/8/21  Meeta Ayala MD        Patient Active Problem List   Diagnosis    Medication management    Low back pain    Hypothyroid    Hypertension    Gastroesophageal reflux disease    Fatty liver    Esophageal dysphagia    Depression    Anxiety    Arthritis    Hx of bariatric surgery    History of suburethral sling procedure    Midline cystocele    Morbid (severe) obesity due to excess calories    Vaginal vault prolapse    Rectocele    Polyp of colon    Acute left-sided low back pain with left-sided sciatica    History of colon polyps     Screening for malignant neoplasm of colon        Past Surgical History:   Procedure Laterality Date    APPENDECTOMY  1969    BARIATRIC SURGERY      Gastric Sleeve     SECTION      CHOLECYSTECTOMY      COLONOSCOPY  2017    COLONOSCOPY N/A 2023    Procedure: COLONOSCOPY FOR SCREENING with polypectomy with cold snare;  Surgeon: Destini Randolph MD;  Location: [facility] ENDOSCOPY;  Service: Gastroenterology;  Laterality: N/A;  colon polyp, diverticulosis    CYSTOCELE REPAIR  07/15/2020    GASTRIC SLEEVE LAPAROSCOPIC  2019    HYSTERECTOMY  1984    RECTOCELE REPAIR  07/15/2020    SHOULDER SURGERY Left     TONSILLECTOMY      UPPER GASTROINTESTINAL ENDOSCOPY      VAGINAL REPAIR  07/15/2020    vaginal wall lift       Social History     Socioeconomic History    Marital status:    Tobacco Use    Smoking status: Never    Smokeless tobacco: Never   Vaping Use    Vaping status: Never Used   Substance and Sexual Activity    Alcohol use: Yes     Comment: rarely    Drug use: Yes     Types: Benzodiazepines     Comment: lorazepam    Sexual activity: Yes     Partners: Male     Birth control/protection: Hysterectomy     Comment: Spouse only       Family History   Problem Relation Age of Onset    Heart disease Mother     Diabetes Mother     Depression Mother     Hypertension Mother     Hypertension Father     Diabetes Sister     Depression Sister     Hypertension Sister     Heart disease Sister     Kidney disease Sister     Hypertension Brother     Heart disease Brother     Diabetes Brother     Kidney disease Brother     Diabetes Sister     Heart disease Sister     Hypertension Sister     Malig Hyperthermia Neg Hx        Family history, surgical history, past medical history, Allergies and meds reviewed with patient today and updated in [facility] EMR.     ROS:  Review of Systems   Constitutional:  Negative for fatigue.   Respiratory:  Negative for cough, chest tightness, shortness of breath and  "wheezing.    Cardiovascular:  Negative for chest pain and palpitations.   Neurological:  Negative for headache.   Psychiatric/Behavioral:  Positive for sleep disturbance. Negative for depressed mood. The patient is nervous/anxious.        OBJECTIVE:  Vitals:    12/09/24 0845   BP: 127/70   BP Location: Left arm   Patient Position: Sitting   Pulse: 66   Temp: 97 °F (36.1 °C)   SpO2: 96%   Weight: 88.5 kg (195 lb)   Height: 170.2 cm (67\")     No results found.   Body mass index is 30.54 kg/m².  No LMP recorded. Patient has had a hysterectomy.    The 10-year ASCVD risk score (Magui VARGHESE, et al., 2019) is: 16.9%    Values used to calculate the score:      Age: 74 years      Sex: Female      Is Non- : No      Diabetic: No      Tobacco smoker: No      Systolic Blood Pressure: 127 mmHg      Is BP treated: Yes      HDL Cholesterol: 46 mg/dL      Total Cholesterol: 182 mg/dL     Physical Exam  Vitals and nursing note reviewed.   Constitutional:       General: She is not in acute distress.     Appearance: Normal appearance. She is obese.   HENT:      Head: Normocephalic.      Right Ear: Tympanic membrane, ear canal and external ear normal.      Left Ear: Tympanic membrane, ear canal and external ear normal.      Nose: Nose normal.      Mouth/Throat:      Mouth: Mucous membranes are moist.      Pharynx: Oropharynx is clear.   Eyes:      General: No scleral icterus.     Conjunctiva/sclera: Conjunctivae normal.      Pupils: Pupils are equal, round, and reactive to light.   Cardiovascular:      Rate and Rhythm: Normal rate and regular rhythm.      Pulses: Normal pulses.      Heart sounds: Normal heart sounds. No murmur heard.  Pulmonary:      Effort: Pulmonary effort is normal.      Breath sounds: Normal breath sounds. No wheezing, rhonchi or rales.   Musculoskeletal:      Cervical back: Neck supple. No rigidity or tenderness.   Lymphadenopathy:      Cervical: No cervical adenopathy.   Skin:     General: " Skin is warm and dry.      Coloration: Skin is not jaundiced.      Findings: No rash.   Neurological:      General: No focal deficit present.      Mental Status: She is alert and oriented to person, place, and time.   Psychiatric:         Mood and Affect: Mood normal.         Thought Content: Thought content normal.         Judgment: Judgment normal.         Procedures    No visits with results within 30 Day(s) from this visit.   Latest known visit with results is:   Office Visit on 06/07/2024   Component Date Value Ref Range Status    Glucose 06/07/2024 112 (H)  65 - 99 mg/dL Final    BUN 06/07/2024 9  8 - 23 mg/dL Final    Creatinine 06/07/2024 0.75  0.57 - 1.00 mg/dL Final    Sodium 06/07/2024 138  136 - 145 mmol/L Final    Potassium 06/07/2024 4.4  3.5 - 5.2 mmol/L Final    Chloride 06/07/2024 102  98 - 107 mmol/L Final    CO2 06/07/2024 24.3  22.0 - 29.0 mmol/L Final    Calcium 06/07/2024 9.8  8.6 - 10.5 mg/dL Final    Total Protein 06/07/2024 6.9  6.0 - 8.5 g/dL Final    Albumin 06/07/2024 4.4  3.5 - 5.2 g/dL Final    ALT (SGPT) 06/07/2024 15  1 - 33 U/L Final    AST (SGOT) 06/07/2024 17  1 - 32 U/L Final    Alkaline Phosphatase 06/07/2024 59  39 - 117 U/L Final    Total Bilirubin 06/07/2024 0.3  0.0 - 1.2 mg/dL Final    Globulin 06/07/2024 2.5  gm/dL Final    A/G Ratio 06/07/2024 1.8  g/dL Final    BUN/Creatinine Ratio 06/07/2024 12.0  7.0 - 25.0 Final    Anion Gap 06/07/2024 11.7  5.0 - 15.0 mmol/L Final    eGFR 06/07/2024 84.7  >60.0 mL/min/1.73 Final    Total Cholesterol 06/07/2024 182  0 - 200 mg/dL Final    Triglycerides 06/07/2024 101  0 - 150 mg/dL Final    HDL Cholesterol 06/07/2024 46  40 - 60 mg/dL Final    LDL Cholesterol  06/07/2024 118 (H)  0 - 100 mg/dL Final    VLDL Cholesterol 06/07/2024 18  5 - 40 mg/dL Final    LDL/HDL Ratio 06/07/2024 2.52   Final    TSH 06/07/2024 0.510  0.270 - 4.200 uIU/mL Final       ASSESSMENT/ PLAN:    Diagnoses and all orders for this visit:    1. Anxiety  (Primary)  Assessment & Plan:  Her anxiety is a little bit worse due to one of her daughter's divorce.  This is created some increased personal anxiety and using her lorazepam more often.  Instructed her there is not much that medicine would do to help resolve that.  She continues to be emotionally and physically supportive is much as possible.    Orders:  -     LORazepam (ATIVAN) 1 MG tablet; Take 1 tablet by mouth Every 8 (Eight) Hours As Needed for Anxiety.  Dispense: 60 tablet; Refill: 3    2. Primary hypertension  Assessment & Plan:  Her blood pressure is good here today will continue her current meds and update her lab    Orders:  -     Comprehensive Metabolic Panel  -     Lipid Panel    3. Acquired hypothyroidism  Assessment & Plan:  Will update her TSH with her routine labs.    Orders:  -     TSH Rfx On Abnormal To Free T4        BMI is >= 30 and <35. (Class 1 Obesity). The following options were offered after discussion;: exercise counseling/recommendations and nutrition counseling/recommendations      Orders Placed Today:     New Medications Ordered This Visit   Medications    LORazepam (ATIVAN) 1 MG tablet     Sig: Take 1 tablet by mouth Every 8 (Eight) Hours As Needed for Anxiety.     Dispense:  60 tablet     Refill:  3        Management Plan:     An After Visit Summary was printed and given to the patient at discharge.    Follow-up: Return in about 6 months (around 6/9/2025) for Recheck.    Juan Antonio Gagnon DO 12/9/2024 09:28 EST  This note was electronically signed.

## 2024-12-09 NOTE — ASSESSMENT & PLAN NOTE
Her anxiety is a little bit worse due to one of her daughter's divorce.  This is created some increased personal anxiety and using her lorazepam more often.  Instructed her there is not much that medicine would do to help resolve that.  She continues to be emotionally and physically supportive is much as possible.

## 2024-12-14 DIAGNOSIS — F33.0 MILD EPISODE OF RECURRENT MAJOR DEPRESSIVE DISORDER: ICD-10-CM

## 2024-12-16 RX ORDER — CITALOPRAM HYDROBROMIDE 40 MG/1
40 TABLET ORAL DAILY
Qty: 90 TABLET | Refills: 1 | Status: SHIPPED | OUTPATIENT
Start: 2024-12-16

## 2025-02-19 ENCOUNTER — TELEPHONE (OUTPATIENT)
Dept: GASTROENTEROLOGY | Facility: CLINIC | Age: 75
End: 2025-02-19
Payer: MEDICARE

## 2025-02-19 NOTE — PRE-PROCEDURE INSTRUCTIONS
Attempted PAT call.  No answer.  Left message requesting return call.  Arrival time given:  0600   No

## 2025-02-21 ENCOUNTER — ANESTHESIA EVENT (OUTPATIENT)
Dept: GASTROENTEROLOGY | Facility: HOSPITAL | Age: 75
End: 2025-02-21
Payer: MEDICARE

## 2025-02-21 NOTE — ANESTHESIA PREPROCEDURE EVALUATION
Anesthesia Evaluation     Patient summary reviewed and Nursing notes reviewed   NPO Solid Status: > 8 hours  NPO Liquid Status: > 6 hours           Airway   Mallampati: I  TM distance: >3 FB  Neck ROM: full  No difficulty expected  Dental - normal exam     Comment: Permanent implants     Pulmonary - normal exam    breath sounds clear to auscultation  (+) ,sleep apnea (no longer has d/t wt loss)  Cardiovascular - normal exam  Exercise tolerance: good (4-7 METS)    Rhythm: regular  Rate: normal    (+) hypertension well controlled, DVT      Neuro/Psych  (+) numbness, psychiatric history Anxiety and Depression  GI/Hepatic/Renal/Endo    (+) obesity, morbid obesity, GERD well controlled, liver disease fatty liver disease, thyroid problem hypothyroidism    ROS Comment: Gastric bypass    Musculoskeletal     Abdominal    Substance History      OB/GYN          Other   arthritis,     ROS/Med Hx Other: Screening. Hx polyps     S/p bariatric sleeve     No EKG on file                 Anesthesia Plan    ASA 3     general   total IV anesthesia  (Total IV Anesthesia    Patient understands anesthesia not responsible for dental damage.      Discussed risks with pt including aspiration, allergic reactions, apnea, advanced airway placement. Pt verbalized understanding. All questions answered.     )  intravenous induction     Anesthetic plan, risks, benefits, and alternatives have been provided, discussed and informed consent has been obtained with: patient and spouse/significant other.  Pre-procedure education provided  Plan discussed with CRNA.      CODE STATUS:

## 2025-02-24 ENCOUNTER — HOSPITAL ENCOUNTER (OUTPATIENT)
Facility: HOSPITAL | Age: 75
Setting detail: HOSPITAL OUTPATIENT SURGERY
Discharge: HOME | End: 2025-02-24
Attending: INTERNAL MEDICINE | Admitting: INTERNAL MEDICINE
Payer: MEDICARE

## 2025-02-24 ENCOUNTER — ANESTHESIA (OUTPATIENT)
Dept: GASTROENTEROLOGY | Facility: HOSPITAL | Age: 75
End: 2025-02-24
Payer: MEDICARE

## 2025-02-24 VITALS
TEMPERATURE: 98.7 F | HEART RATE: 57 BPM | DIASTOLIC BLOOD PRESSURE: 76 MMHG | WEIGHT: 197.53 LBS | SYSTOLIC BLOOD PRESSURE: 154 MMHG | OXYGEN SATURATION: 98 % | BODY MASS INDEX: 30.94 KG/M2 | RESPIRATION RATE: 17 BRPM

## 2025-02-24 DIAGNOSIS — Z12.11 SCREENING FOR MALIGNANT NEOPLASM OF COLON: ICD-10-CM

## 2025-02-24 DIAGNOSIS — Z86.0100 HISTORY OF COLON POLYPS: ICD-10-CM

## 2025-02-24 PROCEDURE — 88305 TISSUE EXAM BY PATHOLOGIST: CPT | Performed by: INTERNAL MEDICINE

## 2025-02-24 PROCEDURE — 25810000003 LACTATED RINGERS PER 1000 ML

## 2025-02-24 PROCEDURE — 45385 COLONOSCOPY W/LESION REMOVAL: CPT | Performed by: INTERNAL MEDICINE

## 2025-02-24 PROCEDURE — 25010000002 PROPOFOL 10 MG/ML EMULSION: Performed by: NURSE ANESTHETIST, CERTIFIED REGISTERED

## 2025-02-24 RX ORDER — PROPOFOL 10 MG/ML
VIAL (ML) INTRAVENOUS AS NEEDED
Status: DISCONTINUED | OUTPATIENT
Start: 2025-02-24 | End: 2025-02-24 | Stop reason: SURG

## 2025-02-24 RX ORDER — SODIUM CHLORIDE, SODIUM LACTATE, POTASSIUM CHLORIDE, CALCIUM CHLORIDE 600; 310; 30; 20 MG/100ML; MG/100ML; MG/100ML; MG/100ML
30 INJECTION, SOLUTION INTRAVENOUS CONTINUOUS
Status: DISCONTINUED | OUTPATIENT
Start: 2025-02-24 | End: 2025-02-24 | Stop reason: HOSPADM

## 2025-02-24 RX ADMIN — PROPOFOL 200 MCG/KG/MIN: 10 INJECTION, EMULSION INTRAVENOUS at 07:25

## 2025-02-24 RX ADMIN — PROPOFOL 50 MG: 10 INJECTION, EMULSION INTRAVENOUS at 07:25

## 2025-02-24 RX ADMIN — SODIUM CHLORIDE, SODIUM LACTATE, POTASSIUM CHLORIDE, CALCIUM CHLORIDE 30 ML/HR: 20; 30; 600; 310 INJECTION, SOLUTION INTRAVENOUS at 06:31

## 2025-02-24 NOTE — H&P
"Pre Procedure History & Physical    Chief Complaint:   Surveillance colonoscopy    Subjective     HPI:   75 yo F here for surveillance colonoscopy.    Past Medical History:   Past Medical History:   Diagnosis Date    Anxiety     Arthritis     Colon polyp     Deep vein thrombosis     After surgery for meniscus.  Right knee    Depression 2015    Diverticulitis     Esophageal dysphagia     Fatty liver     Gastroesophageal reflux disease     History of bladder surgery 2021    History of cataract extraction 2021    History of cystocele 2020    repaired 2020    History of DVT (deep vein thrombosis) 2019    History of gastric polyp 2021    History of repair of rectocele 2020    repaired 2020    Hypothyroid 2018    Irritable bowel syndrome (IBS)     Long-term use of high-risk medication 2015    Low back pain 2014    Medication management 2018    Skin lesion 2015    Sleep apnea     \"long ago before weight loss\"    Status post gastric bypass for obesity 2015       Past Surgical History:  Past Surgical History:   Procedure Laterality Date    APPENDECTOMY  1969    BARIATRIC SURGERY      Gastric Sleeve     SECTION      CHOLECYSTECTOMY      COLONOSCOPY  2017    COLONOSCOPY N/A 2023    Procedure: COLONOSCOPY FOR SCREENING with polypectomy with cold snare;  Surgeon: Destini Randolph MD;  Location: [facility] ENDOSCOPY;  Service: Gastroenterology;  Laterality: N/A;  colon polyp, diverticulosis    CYSTOCELE REPAIR  07/15/2020    GASTRIC SLEEVE LAPAROSCOPIC  2019    HYSTERECTOMY  1984    RECTOCELE REPAIR  07/15/2020    SHOULDER SURGERY Left 2001    TONSILLECTOMY  1968    UPPER GASTROINTESTINAL ENDOSCOPY      VAGINAL REPAIR  07/15/2020    vaginal wall lift       Family History:  Family History   Problem Relation Age of Onset    Heart disease Mother     Diabetes Mother     Depression Mother     Hypertension Mother     " Hypertension Father     Diabetes Sister     Depression Sister     Hypertension Sister     Heart disease Sister     Kidney disease Sister     Hypertension Brother     Heart disease Brother     Diabetes Brother     Kidney disease Brother     Diabetes Sister     Heart disease Sister     Hypertension Sister     Malig Hyperthermia Neg Hx        Social History:   reports that she has never smoked. She has never used smokeless tobacco. She reports current alcohol use. She reports current drug use. Drug: Benzodiazepines.    Medications:   Medications Prior to Admission   Medication Sig Dispense Refill Last Dose/Taking    Calcium Citrate-Vitamin D 500-500 MG-UNIT chewable tablet Chew.   2/23/2025    carboxymethylcellulose (REFRESH PLUS) 0.5 % solution 3 (Three) Times a Day As Needed.   Past Week    Cholecalciferol 50 MCG (2000 UT) tablet Take  by mouth Daily.   2/23/2025    citalopram (CeleXA) 40 MG tablet TAKE 1 TABLET BY MOUTH DAILY 90 tablet 1 2/23/2025    cyclobenzaprine (FLEXERIL) 10 MG tablet Take 1 tablet by mouth 3 (Three) Times a Day As Needed for Muscle Spasms. 60 tablet 5 Past Month    dilTIAZem CD (CARDIZEM CD) 240 MG 24 hr capsule Take 1 capsule by mouth Daily. 90 capsule 3 2/23/2025    estradiol (ESTRACE) 0.1 MG/GM vaginal cream Pea size amount to urethra 2-3x/week.   Past Week    irbesartan (AVAPRO) 300 MG tablet Take 1 tablet by mouth Daily. 90 tablet 3 2/23/2025    levothyroxine (SYNTHROID, LEVOTHROID) 112 MCG tablet Take 1 tablet by mouth Daily. 90 tablet 3 2/23/2025    LORazepam (ATIVAN) 1 MG tablet Take 1 tablet by mouth Every 8 (Eight) Hours As Needed for Anxiety. 60 tablet 3 2/23/2025    multivitamins-minerals (PRESERVISION AREDS 2) capsule capsule Take 1 capsule by mouth 2 (Two) Times a Day.   2/23/2025    vitamin B-12 (CYANOCOBALAMIN) 1000 MCG tablet Take 1 tablet by mouth Daily.   2/23/2025    docusate calcium (SURFAK) 240 MG capsule Take 1 capsule by mouth 2 (Two) Times a Day.   More than a month     fexofenadine (ALLEGRA) 180 MG tablet Take 1 tablet by mouth Daily As Needed.   More than a month    indapamide (LOZOL) 2.5 MG tablet Take 1 tablet by mouth Daily. (Patient taking differently: Take 1 tablet by mouth Daily As Needed.) 90 tablet 3 More than a month    Psyllium (METAMUCIL FIBER PO) Take  by mouth At Night As Needed.   More than a month    saccharomyces boulardii (FLORASTOR) 250 MG capsule Take 1 capsule by mouth. Only when on antibiotic   More than a month    Simethicone (GAS-X PO) Take  by mouth Every 4 (Four) Hours As Needed.   More than a month       Allergies:  Hydrochlorothiazide, Sulfa antibiotics, Tape, and Lidocaine    ROS:    Pertinent items are noted in HPI     Objective     Blood pressure 159/72, pulse 76, temperature 97.1 °F (36.2 °C), temperature source Temporal, resp. rate 18, weight 89.6 kg (197 lb 8.5 oz), SpO2 96%.    Physical Exam   Constitutional: Pt is oriented to person, place, and time and well-developed, well-nourished, and in no distress.   Mouth/Throat: Oropharynx is clear and moist.   Neck: Normal range of motion.   Cardiovascular: Normal rate, regular rhythm and normal heart sounds.    Pulmonary/Chest: Effort normal and breath sounds normal.   Abdominal: Soft. Nontender  Skin: Skin is warm and dry.   Psychiatric: Mood, memory, affect and judgment normal.     Assessment & Plan     Diagnosis:  Surveillance colonoscopy    Anticipated Surgical Procedure:  Colonoscopy    The risks, benefits, and alternatives of this procedure have been discussed with the patient or the responsible party- the patient understands and agrees to proceed.

## 2025-02-24 NOTE — ANESTHESIA POSTPROCEDURE EVALUATION
Patient: Shalonda Hart    Procedure Summary       Date: 02/24/25 Room / Location: [facility] ENDOSCOPY 1 / [facility] ENDOSCOPY    Anesthesia Start: 0725 Anesthesia Stop: 0749    Procedure: COLONOSCOPY WITH COLD SNARE POLYPECTOMY Diagnosis:       History of colon polyps      Screening for malignant neoplasm of colon      (History of colon polyps [Z86.010])      (Screening for malignant neoplasm of colon [Z12.11])    Surgeons: Destini Randolph MD Provider: Bao Woods CRNA    Anesthesia Type: general ASA Status: 3            Anesthesia Type: general    Vitals  Vitals Value Taken Time   /76 02/24/25 0802   Temp 37.1 °C (98.7 °F) 02/24/25 0802   Pulse 57 02/24/25 0802   Resp 17 02/24/25 0802   SpO2 98 % 02/24/25 0802           Post Anesthesia Care and Evaluation    Post-procedure mental status: acceptable.  Pain management: satisfactory to patient    Airway patency: patent  Anesthetic complications: No anesthetic complications    Cardiovascular status: acceptable  Respiratory status: acceptable    Comments: Per chart review

## 2025-02-25 ENCOUNTER — PREP FOR SURGERY (OUTPATIENT)
Dept: OTHER | Facility: HOSPITAL | Age: 75
End: 2025-02-25
Payer: MEDICARE

## 2025-02-25 DIAGNOSIS — Z12.11 COLON CANCER SCREENING: Primary | ICD-10-CM

## 2025-02-26 ENCOUNTER — TELEPHONE (OUTPATIENT)
Dept: GASTROENTEROLOGY | Facility: CLINIC | Age: 75
End: 2025-02-26
Payer: MEDICARE

## 2025-02-26 NOTE — TELEPHONE ENCOUNTER
S/w pt in regards to colonoscopy on 8/4/2025 advised  is out of office. Rescheduled pt to 7/21/2025.

## 2025-05-07 DIAGNOSIS — F41.9 ANXIETY: ICD-10-CM

## 2025-05-07 RX ORDER — LORAZEPAM 1 MG/1
1 TABLET ORAL EVERY 8 HOURS PRN
Qty: 60 TABLET | Refills: 0 | Status: SHIPPED | OUTPATIENT
Start: 2025-05-07

## 2025-06-06 ENCOUNTER — TELEPHONE (OUTPATIENT)
Dept: GASTROENTEROLOGY | Facility: CLINIC | Age: 75
End: 2025-06-06
Payer: MEDICARE

## 2025-06-06 NOTE — TELEPHONE ENCOUNTER
ENDO RECONCILIATION  Verify source of procedure(s): Other  If other, please list source: results    TIME OUT-CONFIRM CORRECT PROCEDURE: Colonoscopy  Cardiology:  Pulmonology:  Blood thinner:   GLP-1:  Additional DX/indication for procedure:     Please include any other notes relevant to endo reconciliation:     Extended bowel prep

## 2025-06-09 ENCOUNTER — OFFICE VISIT (OUTPATIENT)
Dept: FAMILY MEDICINE CLINIC | Facility: CLINIC | Age: 75
End: 2025-06-09
Payer: MEDICARE

## 2025-06-09 VITALS
WEIGHT: 199 LBS | SYSTOLIC BLOOD PRESSURE: 153 MMHG | OXYGEN SATURATION: 97 % | HEART RATE: 76 BPM | HEIGHT: 67 IN | BODY MASS INDEX: 31.23 KG/M2 | DIASTOLIC BLOOD PRESSURE: 75 MMHG | TEMPERATURE: 98 F

## 2025-06-09 DIAGNOSIS — F33.0 MILD EPISODE OF RECURRENT MAJOR DEPRESSIVE DISORDER: ICD-10-CM

## 2025-06-09 DIAGNOSIS — F41.9 ANXIETY: ICD-10-CM

## 2025-06-09 DIAGNOSIS — Z98.84 STATUS POST GASTRIC BYPASS FOR OBESITY: ICD-10-CM

## 2025-06-09 DIAGNOSIS — E03.9 ACQUIRED HYPOTHYROIDISM: ICD-10-CM

## 2025-06-09 DIAGNOSIS — I10 PRIMARY HYPERTENSION: Primary | ICD-10-CM

## 2025-06-09 LAB
ALBUMIN SERPL-MCNC: 4.4 G/DL (ref 3.5–5.2)
ALBUMIN/GLOB SERPL: 1.7 G/DL
ALP SERPL-CCNC: 68 U/L (ref 39–117)
ALT SERPL W P-5'-P-CCNC: 17 U/L (ref 1–33)
ANION GAP SERPL CALCULATED.3IONS-SCNC: 8.2 MMOL/L (ref 5–15)
AST SERPL-CCNC: 21 U/L (ref 1–32)
BILIRUB SERPL-MCNC: 0.4 MG/DL (ref 0–1.2)
BUN SERPL-MCNC: 7 MG/DL (ref 8–23)
BUN/CREAT SERPL: 8.9 (ref 7–25)
CALCIUM SPEC-SCNC: 10.2 MG/DL (ref 8.6–10.5)
CHLORIDE SERPL-SCNC: 102 MMOL/L (ref 98–107)
CHOLEST SERPL-MCNC: 170 MG/DL (ref 0–200)
CO2 SERPL-SCNC: 27.8 MMOL/L (ref 22–29)
CREAT SERPL-MCNC: 0.79 MG/DL (ref 0.57–1)
DEPRECATED RDW RBC AUTO: 45.1 FL (ref 37–54)
EGFRCR SERPLBLD CKD-EPI 2021: 79.1 ML/MIN/1.73
ERYTHROCYTE [DISTWIDTH] IN BLOOD BY AUTOMATED COUNT: 13 % (ref 12.3–15.4)
FERRITIN SERPL-MCNC: 43.4 NG/ML (ref 13–150)
FOLATE SERPL-MCNC: 20 NG/ML (ref 4.78–24.2)
GLOBULIN UR ELPH-MCNC: 2.6 GM/DL
GLUCOSE SERPL-MCNC: 101 MG/DL (ref 65–99)
HCT VFR BLD AUTO: 37.7 % (ref 34–46.6)
HDLC SERPL-MCNC: 46 MG/DL (ref 40–60)
HGB BLD-MCNC: 12.7 G/DL (ref 12–15.9)
IRON 24H UR-MRATE: 112 MCG/DL (ref 37–145)
IRON SATN MFR SERPL: 28 % (ref 20–50)
LDLC SERPL CALC-MCNC: 102 MG/DL (ref 0–100)
LDLC/HDLC SERPL: 2.16 {RATIO}
MCH RBC QN AUTO: 31.6 PG (ref 26.6–33)
MCHC RBC AUTO-ENTMCNC: 33.7 G/DL (ref 31.5–35.7)
MCV RBC AUTO: 93.8 FL (ref 79–97)
PLATELET # BLD AUTO: 261 10*3/MM3 (ref 140–450)
PMV BLD AUTO: 10.9 FL (ref 6–12)
POTASSIUM SERPL-SCNC: 4.3 MMOL/L (ref 3.5–5.2)
PROT SERPL-MCNC: 7 G/DL (ref 6–8.5)
RBC # BLD AUTO: 4.02 10*6/MM3 (ref 3.77–5.28)
SODIUM SERPL-SCNC: 138 MMOL/L (ref 136–145)
TIBC SERPL-MCNC: 407 MCG/DL (ref 298–536)
TRANSFERRIN SERPL-MCNC: 273 MG/DL (ref 200–360)
TRIGL SERPL-MCNC: 123 MG/DL (ref 0–150)
TSH SERPL DL<=0.05 MIU/L-ACNC: 4.11 UIU/ML (ref 0.27–4.2)
VIT B12 BLD-MCNC: 785 PG/ML (ref 211–946)
VLDLC SERPL-MCNC: 22 MG/DL (ref 5–40)
WBC NRBC COR # BLD AUTO: 6.96 10*3/MM3 (ref 3.4–10.8)

## 2025-06-09 PROCEDURE — 99214 OFFICE O/P EST MOD 30 MIN: CPT | Performed by: FAMILY MEDICINE

## 2025-06-09 PROCEDURE — 84443 ASSAY THYROID STIM HORMONE: CPT | Performed by: FAMILY MEDICINE

## 2025-06-09 PROCEDURE — 3077F SYST BP >= 140 MM HG: CPT | Performed by: FAMILY MEDICINE

## 2025-06-09 PROCEDURE — 82746 ASSAY OF FOLIC ACID SERUM: CPT | Performed by: FAMILY MEDICINE

## 2025-06-09 PROCEDURE — 3078F DIAST BP <80 MM HG: CPT | Performed by: FAMILY MEDICINE

## 2025-06-09 PROCEDURE — 83540 ASSAY OF IRON: CPT | Performed by: FAMILY MEDICINE

## 2025-06-09 PROCEDURE — 84466 ASSAY OF TRANSFERRIN: CPT | Performed by: FAMILY MEDICINE

## 2025-06-09 PROCEDURE — 85027 COMPLETE CBC AUTOMATED: CPT | Performed by: FAMILY MEDICINE

## 2025-06-09 PROCEDURE — 36415 COLL VENOUS BLD VENIPUNCTURE: CPT | Performed by: FAMILY MEDICINE

## 2025-06-09 PROCEDURE — 80053 COMPREHEN METABOLIC PANEL: CPT | Performed by: FAMILY MEDICINE

## 2025-06-09 PROCEDURE — 80061 LIPID PANEL: CPT | Performed by: FAMILY MEDICINE

## 2025-06-09 PROCEDURE — 82728 ASSAY OF FERRITIN: CPT | Performed by: FAMILY MEDICINE

## 2025-06-09 PROCEDURE — 82607 VITAMIN B-12: CPT | Performed by: FAMILY MEDICINE

## 2025-06-09 RX ORDER — LORAZEPAM 1 MG/1
1 TABLET ORAL EVERY 8 HOURS PRN
Qty: 60 TABLET | Refills: 3 | Status: SHIPPED | OUTPATIENT
Start: 2025-06-09

## 2025-06-09 RX ORDER — DILTIAZEM HYDROCHLORIDE 240 MG/1
240 CAPSULE, COATED, EXTENDED RELEASE ORAL DAILY
Qty: 90 CAPSULE | Refills: 3 | Status: SHIPPED | OUTPATIENT
Start: 2025-06-09

## 2025-06-09 RX ORDER — LEVOTHYROXINE SODIUM 112 UG/1
112 TABLET ORAL DAILY
Qty: 90 TABLET | Refills: 3 | Status: SHIPPED | OUTPATIENT
Start: 2025-06-09

## 2025-06-09 RX ORDER — CLOBETASOL PROPIONATE 0.5 MG/G
1 OINTMENT TOPICAL 2 TIMES DAILY
COMMUNITY

## 2025-06-09 RX ORDER — CITALOPRAM HYDROBROMIDE 40 MG/1
40 TABLET ORAL DAILY
Qty: 90 TABLET | Refills: 1 | Status: SHIPPED | OUTPATIENT
Start: 2025-06-09

## 2025-06-09 RX ORDER — IRBESARTAN 300 MG/1
300 TABLET ORAL DAILY
Qty: 90 TABLET | Refills: 3 | Status: SHIPPED | OUTPATIENT
Start: 2025-06-09
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
## 2025-06-18 DIAGNOSIS — I10 PRIMARY HYPERTENSION: ICD-10-CM

## 2025-06-18 RX ORDER — INDAPAMIDE 2.5 MG/1
2.5 TABLET ORAL DAILY
Qty: 90 TABLET | Refills: 3 | Status: SHIPPED | OUTPATIENT
Start: 2025-06-18

## 2025-07-09 NOTE — PRE-PROCEDURE INSTRUCTIONS
"Instructed on date and arrival time of 0630. Instructed that arrival time is not their procedure time but allows time to prepare for procedure.  Come to entrance \"C\". Must have  over age 18 to drive home.  May have two visitors; however, children under 12 must stay in waiting room.  Discussed clear liquid diet (no red or purple), bowel prep, and NPO.  May take medications as usual except for blood thinners, diabetic medications, and weight loss medications.  Absolutely nothing by mouth 2 hours prior to arrival.  Leave jewelry and other valuables at home.  Expect to be at hospital for 3-4 hours.  Verbalized understanding of instructions given.  Instructed to call for questions or concerns.  "

## 2025-07-11 ENCOUNTER — ANESTHESIA EVENT (OUTPATIENT)
Dept: GASTROENTEROLOGY | Facility: HOSPITAL | Age: 75
End: 2025-07-11
Payer: MEDICARE

## 2025-07-17 NOTE — ANESTHESIA PREPROCEDURE EVALUATION
" Anesthesia Evaluation     Patient summary reviewed and Nursing notes reviewed   NPO Solid Status: > 8 hours  NPO Liquid Status: > 2 hours           Airway   Mallampati: II  TM distance: >3 FB  Neck ROM: full  No difficulty expected  Dental - normal exam     Pulmonary - normal exam    breath sounds clear to auscultationSleep apnea: \"long ago before weight loss\".  Cardiovascular - normal exam    Rhythm: regular  Rate: normal    (+) hypertension less than 2 medications, DVT      Neuro/Psych  (+) numbness, psychiatric history Anxiety and Depression  GI/Hepatic/Renal/Endo    (+) obesity, GERD, liver disease fatty liver disease, thyroid problem hypothyroidism    ROS Comment: H/O gastric bypass, esophageal dysphagia    Musculoskeletal     Abdominal  - normal exam    Bowel sounds: normal.   Substance History - negative use     OB/GYN negative ob/gyn ROS         Other   arthritis,     ROS/Med Hx Other: No EKG on file, only telemetry scan                    Anesthesia Plan    ASA 3     general   total IV anesthesia  (Risks explained including allergic reactions, BP, HR, O2 changes, aspiration, apnea, advanced airway placement. Pt verbalized understanding. Patient understands anesthesia not responsible for dental damage.)  intravenous induction     Anesthetic plan, risks, benefits, and alternatives have been provided, discussed and informed consent has been obtained with: patient.  Pre-procedure education provided  Plan discussed with CRNA.      CODE STATUS:         "

## 2025-07-21 ENCOUNTER — ANESTHESIA (OUTPATIENT)
Dept: GASTROENTEROLOGY | Facility: HOSPITAL | Age: 75
End: 2025-07-21
Payer: MEDICARE

## 2025-07-21 ENCOUNTER — HOSPITAL ENCOUNTER (OUTPATIENT)
Facility: HOSPITAL | Age: 75
Setting detail: HOSPITAL OUTPATIENT SURGERY
Discharge: HOME | End: 2025-07-21
Attending: INTERNAL MEDICINE | Admitting: INTERNAL MEDICINE
Payer: MEDICARE

## 2025-07-21 VITALS
OXYGEN SATURATION: 100 % | WEIGHT: 195.55 LBS | TEMPERATURE: 97.7 F | HEART RATE: 72 BPM | RESPIRATION RATE: 18 BRPM | DIASTOLIC BLOOD PRESSURE: 52 MMHG | SYSTOLIC BLOOD PRESSURE: 141 MMHG | BODY MASS INDEX: 30.63 KG/M2

## 2025-07-21 DIAGNOSIS — Z12.11 COLON CANCER SCREENING: ICD-10-CM

## 2025-07-21 PROCEDURE — 25010000002 PROPOFOL 10 MG/ML EMULSION

## 2025-07-21 PROCEDURE — 45385 COLONOSCOPY W/LESION REMOVAL: CPT | Performed by: INTERNAL MEDICINE

## 2025-07-21 PROCEDURE — 88305 TISSUE EXAM BY PATHOLOGIST: CPT | Performed by: INTERNAL MEDICINE

## 2025-07-21 PROCEDURE — 25810000003 LACTATED RINGERS PER 1000 ML: Performed by: NURSE ANESTHETIST, CERTIFIED REGISTERED

## 2025-07-21 PROCEDURE — 25010000002 LIDOCAINE PF 2% 2 % SOLUTION

## 2025-07-21 RX ORDER — PROPOFOL 10 MG/ML
VIAL (ML) INTRAVENOUS AS NEEDED
Status: DISCONTINUED | OUTPATIENT
Start: 2025-07-21 | End: 2025-07-21 | Stop reason: SURG

## 2025-07-21 RX ORDER — LIDOCAINE HYDROCHLORIDE 20 MG/ML
INJECTION, SOLUTION EPIDURAL; INFILTRATION; INTRACAUDAL; PERINEURAL AS NEEDED
Status: DISCONTINUED | OUTPATIENT
Start: 2025-07-21 | End: 2025-07-21 | Stop reason: SURG

## 2025-07-21 RX ORDER — SODIUM CHLORIDE, SODIUM LACTATE, POTASSIUM CHLORIDE, CALCIUM CHLORIDE 600; 310; 30; 20 MG/100ML; MG/100ML; MG/100ML; MG/100ML
30 INJECTION, SOLUTION INTRAVENOUS CONTINUOUS
Status: DISCONTINUED | OUTPATIENT
Start: 2025-07-21 | End: 2025-07-21 | Stop reason: HOSPADM

## 2025-07-21 RX ADMIN — PROPOFOL 150 MCG/KG/MIN: 10 INJECTION, EMULSION INTRAVENOUS at 07:59

## 2025-07-21 RX ADMIN — PROPOFOL 30 MG: 10 INJECTION, EMULSION INTRAVENOUS at 08:21

## 2025-07-21 RX ADMIN — LIDOCAINE HYDROCHLORIDE 50 MG: 20 INJECTION, SOLUTION EPIDURAL; INFILTRATION; INTRACAUDAL; PERINEURAL at 07:57

## 2025-07-21 RX ADMIN — PROPOFOL 40 MG: 10 INJECTION, EMULSION INTRAVENOUS at 07:57

## 2025-07-21 RX ADMIN — PROPOFOL 30 MG: 10 INJECTION, EMULSION INTRAVENOUS at 07:58

## 2025-07-21 RX ADMIN — SODIUM CHLORIDE, POTASSIUM CHLORIDE, SODIUM LACTATE AND CALCIUM CHLORIDE 30 ML/HR: 600; 310; 30; 20 INJECTION, SOLUTION INTRAVENOUS at 07:17

## 2025-07-21 NOTE — H&P
"Pre Procedure History & Physical    Chief Complaint:   Surveillance colonoscopy    Subjective     HPI:   73 yo F here for surveillance colonoscopy.    Past Medical History:   Past Medical History:   Diagnosis Date    Anxiety     Arthritis     Colon polyp     Deep vein thrombosis     After surgery for meniscus.  Right knee    Depression 2015    Diverticulitis     Esophageal dysphagia     Fatty liver     Gastroesophageal reflux disease     History of bladder surgery 2021    History of cataract extraction 2021    History of cystocele 2020    repaired 2020    History of DVT (deep vein thrombosis) 2019    History of gastric polyp 2021    History of repair of rectocele 2020    repaired 2020    Hypothyroid 2018    Irritable bowel syndrome (IBS)     Long-term use of high-risk medication 2015    Low back pain 2014    Medication management 2018    Skin lesion 2015    Sleep apnea     \"long ago before weight loss\"    Status post gastric bypass for obesity 2015       Past Surgical History:  Past Surgical History:   Procedure Laterality Date    APPENDECTOMY      BARIATRIC SURGERY      Gastric Sleeve     SECTION      CHOLECYSTECTOMY      COLONOSCOPY  2017    COLONOSCOPY N/A 2023    Procedure: COLONOSCOPY FOR SCREENING with polypectomy with cold snare;  Surgeon: Destini Randolph MD;  Location: [facility] ENDOSCOPY;  Service: Gastroenterology;  Laterality: N/A;  colon polyp, diverticulosis    COLONOSCOPY N/A 2025    Procedure: COLONOSCOPY WITH COLD SNARE POLYPECTOMY;  Surgeon: Destini Randolph MD;  Location: [facility] ENDOSCOPY;  Service: Gastroenterology;  Laterality: N/A;  COLON POLYP, DIVERTICULOSIS, POOR PREP    CYSTOCELE REPAIR  07/15/2020    GASTRIC SLEEVE LAPAROSCOPIC  2019    HYSTERECTOMY  1984    RECTOCELE REPAIR  07/15/2020    SHOULDER SURGERY Left     TONSILLECTOMY  1968    UPPER GASTROINTESTINAL " ENDOSCOPY      VAGINAL REPAIR  07/15/2020    vaginal wall lift       Family History:  Family History   Problem Relation Age of Onset    Heart disease Mother     Diabetes Mother     Depression Mother     Hypertension Mother     Hypertension Father     Diabetes Sister     Depression Sister     Hypertension Sister     Heart disease Sister     Kidney disease Sister     Hypertension Brother     Heart disease Brother     Diabetes Brother     Kidney disease Brother     Diabetes Sister     Heart disease Sister     Hypertension Sister     Malig Hyperthermia Neg Hx        Social History:   reports that she has never smoked. She has never used smokeless tobacco. She reports current alcohol use. She reports current drug use. Drug: Benzodiazepines.    Medications:   Medications Prior to Admission   Medication Sig Dispense Refill Last Dose/Taking    Calcium Citrate-Vitamin D 500-500 MG-UNIT chewable tablet Chew.       carboxymethylcellulose (REFRESH PLUS) 0.5 % solution 3 (Three) Times a Day As Needed.       Cholecalciferol 50 MCG (2000 UT) tablet Take  by mouth Daily.       citalopram (CeleXA) 40 MG tablet Take 1 tablet by mouth Daily. 90 tablet 1     clobetasol (TEMOVATE) 0.05 % ointment Apply 1 Application topically to the appropriate area as directed 2 (Two) Times a Day.       cyclobenzaprine (FLEXERIL) 10 MG tablet Take 1 tablet by mouth 3 (Three) Times a Day As Needed for Muscle Spasms. 60 tablet 5     dilTIAZem CD (CARDIZEM CD) 240 MG 24 hr capsule Take 1 capsule by mouth Daily. 90 capsule 3 7/20/2025    docusate calcium (SURFAK) 240 MG capsule Take 1 capsule by mouth 2 (Two) Times a Day.       estradiol (ESTRACE) 0.1 MG/GM vaginal cream Pea size amount to urethra 2-3x/week.       fexofenadine (ALLEGRA) 180 MG tablet Take 1 tablet by mouth Daily As Needed.       indapamide (LOZOL) 2.5 MG tablet TAKE 1 TABLET BY MOUTH DAILY 90 tablet 3     irbesartan (AVAPRO) 300 MG tablet Take 1 tablet by mouth Daily. 90 tablet 3 7/20/2025     levothyroxine (SYNTHROID, LEVOTHROID) 112 MCG tablet Take 1 tablet by mouth Daily. 90 tablet 3     LORazepam (ATIVAN) 1 MG tablet Take 1 tablet by mouth Every 8 (Eight) Hours As Needed for Anxiety. for anxiety 60 tablet 3     multivitamins-minerals (PRESERVISION AREDS 2) capsule capsule Take 1 capsule by mouth 2 (Two) Times a Day.       Psyllium (METAMUCIL FIBER PO) Take  by mouth At Night As Needed.       saccharomyces boulardii (FLORASTOR) 250 MG capsule Take 1 capsule by mouth. Only when on antibiotic       Simethicone (GAS-X PO) Take  by mouth Every 4 (Four) Hours As Needed.       vitamin B-12 (CYANOCOBALAMIN) 1000 MCG tablet Take 1 tablet by mouth Daily.          Allergies:  Hydrochlorothiazide, Sulfa antibiotics, Tape, and Lidocaine    ROS:    Pertinent items are noted in HPI     Objective     Blood pressure 141/66, pulse 67, temperature 97.7 °F (36.5 °C), temperature source Temporal, resp. rate 10, weight 88.7 kg (195 lb 8.8 oz), SpO2 98%.    Physical Exam   Constitutional: Pt is oriented to person, place, and time and well-developed, well-nourished, and in no distress.   Mouth/Throat: Oropharynx is clear and moist.   Neck: Normal range of motion.   Cardiovascular: Normal rate, regular rhythm and normal heart sounds.    Pulmonary/Chest: Effort normal and breath sounds normal.   Abdominal: Soft. Nontender  Skin: Skin is warm and dry.   Psychiatric: Mood, memory, affect and judgment normal.     Assessment & Plan     Diagnosis:  Surveillance colonoscopy    Anticipated Surgical Procedure:  Colonoscopy    The risks, benefits, and alternatives of this procedure have been discussed with the patient or the responsible party- the patient understands and agrees to proceed.

## 2025-07-21 NOTE — ANESTHESIA POSTPROCEDURE EVALUATION
Patient: Shalonda Hart    Procedure Summary       Date: 07/21/25 Room / Location: [facility] ENDOSCOPY 1 / [facility] ENDOSCOPY    Anesthesia Start: 0754 Anesthesia Stop: 0833    Procedure: COLONOSCOPY WITH COLD SNARE POLYPECTOMY Diagnosis:       Colon cancer screening      (Colon cancer screening [Z12.11])    Surgeons: Destini Randolph MD Provider: Anisa Velazquez CRNA    Anesthesia Type: general ASA Status: 3            Anesthesia Type: general    Vitals  Vitals Value Taken Time   BP     Temp     Pulse 75 07/21/25 08:33   Resp     SpO2 100 % 07/21/25 08:33   Vitals shown include unfiled device data.        Post Anesthesia Care and Evaluation    Patient location during evaluation: bedside  Patient participation: complete - patient participated  Level of consciousness: awake  Pain management: adequate    Airway patency: patent  Anesthetic complications: No anesthetic complications  PONV Status: none  Cardiovascular status: acceptable and stable  Respiratory status: acceptable, spontaneous ventilation and room air  Hydration status: acceptable

## 2025-07-23 ENCOUNTER — RESULTS FOLLOW-UP (OUTPATIENT)
Dept: GASTROENTEROLOGY | Facility: HOSPITAL | Age: 75
End: 2025-07-23
Payer: MEDICARE

## 2025-07-23 LAB
CYTO UR: NORMAL
LAB AP CASE REPORT: NORMAL
LAB AP CLINICAL INFORMATION: NORMAL
LAB AP DIAGNOSIS COMMENT: NORMAL
PATH REPORT.FINAL DX SPEC: NORMAL
PATH REPORT.GROSS SPEC: NORMAL

## 2025-07-23 NOTE — LETTER
July 23, 2025    Shalonda Yadi Hart  [address] [place] [address] [place] [address]  7/23/2025         Shalonda Hart   [address] [place] [address] [place] [address]            Dear Shalonda Randolph MD performed a(n) Colonoscopy on July 21, 2025; Your biopsy results were benign.  If applicable, please refer to the pathology and/or procedure report for more detailed information via MyChart or by obtaining a copy of the reports from our office. We recommend that you have a repeat Colonoscopy in 5 years.    A colonoscopy is the best way to screen for colon polyps and colon cancer, however despite careful evaluation, small polyps can sometimes be missed. If you should develop any bleeding, abdominal pain, weight loss, change in bowel habits, or any other GI complications, please inform our office as soon as possible.      Additionally, Destini Randolph MD recommends you adopt the following healthy habits to lower your risk of future polyps and colon cancer:    Exercise regularly.  Do not smoke or consume alcohol.  Limit red meat intake.  Include ample fruits and vegetables in your diet.    If you have any questions regarding your procedure or results, please do not hesitate to contact our office.    Sincerely,        [facility]      Amparo Hinson, MARGARITA

## (undated) DEVICE — LINER SURG CANSTR SXN S/RIGD 1500CC

## (undated) DEVICE — SNAR POLYP CAPTIFLEX XS/OVL 11X2.4MM 240CM 1P/U

## (undated) DEVICE — Device: Brand: DEFENDO AIR/WATER/SUCTION AND BIOPSY VALVE

## (undated) DEVICE — THE SINGLE USE ETRAP – POLYP TRAP IS USED FOR SUCTION RETRIEVAL OF ENDOSCOPICALLY REMOVED POLYPS.: Brand: ETRAP

## (undated) DEVICE — Device

## (undated) DEVICE — SOL IRRG H2O PL/BG 1000ML STRL

## (undated) DEVICE — DEFENDO AIR WATER SUCTION AND BIOPSY VALVE KIT FOR  OLYMPUS: Brand: DEFENDO AIR/WATER/SUCTION AND BIOPSY VALVE

## (undated) DEVICE — SOLIDIFIER LIQLOC PLS 1500CC BT

## (undated) DEVICE — CONN JET HYDRA H20 AUXILIARY DISP